# Patient Record
Sex: MALE | Race: WHITE | NOT HISPANIC OR LATINO | Employment: FULL TIME | URBAN - NONMETROPOLITAN AREA
[De-identification: names, ages, dates, MRNs, and addresses within clinical notes are randomized per-mention and may not be internally consistent; named-entity substitution may affect disease eponyms.]

---

## 2018-01-12 ENCOUNTER — APPOINTMENT (OUTPATIENT)
Dept: CT IMAGING | Facility: HOSPITAL | Age: 43
End: 2018-01-12

## 2018-01-12 ENCOUNTER — APPOINTMENT (OUTPATIENT)
Dept: GENERAL RADIOLOGY | Facility: HOSPITAL | Age: 43
End: 2018-01-12

## 2018-01-12 ENCOUNTER — HOSPITAL ENCOUNTER (EMERGENCY)
Facility: HOSPITAL | Age: 43
Discharge: HOME | End: 2018-01-12
Attending: EMERGENCY MEDICINE | Admitting: EMERGENCY MEDICINE

## 2018-01-12 VITALS
TEMPERATURE: 98.2 F | RESPIRATION RATE: 18 BRPM | SYSTOLIC BLOOD PRESSURE: 126 MMHG | HEART RATE: 87 BPM | BODY MASS INDEX: 27.77 KG/M2 | DIASTOLIC BLOOD PRESSURE: 88 MMHG | OXYGEN SATURATION: 99 % | WEIGHT: 240 LBS | HEIGHT: 78 IN

## 2018-01-12 DIAGNOSIS — M25.512 ACUTE PAIN OF LEFT SHOULDER: ICD-10-CM
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
## 2018-01-12 PROCEDURE — 70450 CT HEAD/BRAIN W/O DYE: CPT

## 2018-01-12 PROCEDURE — 72125 CT NECK SPINE W/O DYE: CPT

## 2018-01-12 PROCEDURE — 73030 X-RAY EXAM OF SHOULDER: CPT

## 2018-01-12 PROCEDURE — 99283 EMERGENCY DEPT VISIT LOW MDM: CPT

## 2018-01-12 RX ORDER — SODIUM CHLORIDE 0.9 % (FLUSH) 0.9 %
10 SYRINGE (ML) INJECTION AS NEEDED
Status: DISCONTINUED | OUTPATIENT
Start: 2018-01-12 | End: 2018-01-12

## 2018-01-12 RX ORDER — PANTOPRAZOLE SODIUM 20 MG/1
20 TABLET, DELAYED RELEASE ORAL DAILY
COMMUNITY
End: 2018-01-23 | Stop reason: SDUPTHER

## 2018-01-12 RX ORDER — LIDOCAINE HYDROCHLORIDE 10 MG/ML
10 INJECTION, SOLUTION INFILTRATION; PERINEURAL ONCE
Status: COMPLETED | OUTPATIENT
Start: 2018-01-12 | End: 2018-01-12

## 2018-01-12 RX ORDER — IBUPROFEN 800 MG/1
800 TABLET ORAL EVERY 8 HOURS PRN
Qty: 60 TABLET | Refills: 0 | Status: SHIPPED | OUTPATIENT
Start: 2018-01-12

## 2018-01-12 RX ORDER — CYCLOBENZAPRINE HCL 10 MG
10 TABLET ORAL 3 TIMES DAILY PRN
Qty: 10 TABLET | Refills: 0 | Status: SHIPPED | OUTPATIENT
Start: 2018-01-12

## 2018-01-12 RX ORDER — ASPIRIN 81 MG/1
81 TABLET ORAL DAILY
COMMUNITY

## 2018-01-12 RX ORDER — BACITRACIN ZINC 500 [USP'U]/G
OINTMENT TOPICAL ONCE
Status: COMPLETED | OUTPATIENT
Start: 2018-01-12 | End: 2018-01-12

## 2018-01-12 RX ORDER — IBUPROFEN 800 MG/1
800 TABLET ORAL ONCE
Status: COMPLETED | OUTPATIENT
Start: 2018-01-12 | End: 2018-01-12

## 2018-01-12 RX ADMIN — BACITRACIN: 500 OINTMENT TOPICAL at 07:18

## 2018-01-12 RX ADMIN — IBUPROFEN 800 MG: 800 TABLET, FILM COATED ORAL at 07:25

## 2018-01-12 RX ADMIN — LIDOCAINE HYDROCHLORIDE 10 ML: 10 INJECTION, SOLUTION INFILTRATION; PERINEURAL at 07:03
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
## 2018-01-23 ENCOUNTER — OFFICE VISIT (OUTPATIENT)
Dept: PULMONOLOGY | Facility: CLINIC | Age: 43
End: 2018-01-23

## 2018-01-23 VITALS
DIASTOLIC BLOOD PRESSURE: 76 MMHG | RESPIRATION RATE: 18 BRPM | HEIGHT: 78 IN | OXYGEN SATURATION: 96 % | WEIGHT: 235 LBS | HEART RATE: 110 BPM | BODY MASS INDEX: 27.19 KG/M2 | SYSTOLIC BLOOD PRESSURE: 120 MMHG

## 2018-01-23 DIAGNOSIS — G47.19 EXCESSIVE DAYTIME SLEEPINESS: ICD-10-CM

## 2018-01-23 DIAGNOSIS — J44.9 CHRONIC OBSTRUCTIVE PULMONARY DISEASE, UNSPECIFIED COPD TYPE (HCC): ICD-10-CM

## 2018-01-23 DIAGNOSIS — G47.33 OBSTRUCTIVE SLEEP APNEA: ICD-10-CM

## 2018-01-23 DIAGNOSIS — R06.83 SNORING: ICD-10-CM

## 2018-01-23 DIAGNOSIS — F17.200 SMOKING: ICD-10-CM

## 2018-01-23 DIAGNOSIS — R06.02 SHORTNESS OF BREATH: Primary | ICD-10-CM

## 2018-01-23 DIAGNOSIS — J42 CHRONIC BRONCHITIS, UNSPECIFIED CHRONIC BRONCHITIS TYPE (HCC): ICD-10-CM

## 2018-01-23 PROCEDURE — 99245 OFF/OP CONSLTJ NEW/EST HI 55: CPT | Performed by: INTERNAL MEDICINE

## 2018-01-23 PROCEDURE — 95012 NITRIC OXIDE EXP GAS DETER: CPT | Performed by: INTERNAL MEDICINE

## 2018-01-23 RX ORDER — BUPROPION HYDROCHLORIDE 150 MG/1
150 TABLET, EXTENDED RELEASE ORAL EVERY 12 HOURS SCHEDULED
Qty: 60 TABLET | Refills: 3 | Status: SHIPPED | OUTPATIENT
Start: 2018-01-23

## 2018-01-23 RX ORDER — PANTOPRAZOLE SODIUM 40 MG/1
TABLET, DELAYED RELEASE ORAL
Refills: 0 | COMMUNITY
Start: 2018-01-11

## 2018-01-23 RX ORDER — ALBUTEROL SULFATE 90 UG/1
2 AEROSOL, METERED RESPIRATORY (INHALATION) EVERY 4 HOURS PRN
Qty: 1 INHALER | Refills: 5 | Status: SHIPPED | OUTPATIENT
Start: 2018-01-23

## 2018-01-23 RX ORDER — FLUTICASONE PROPIONATE 50 MCG
2 SPRAY, SUSPENSION (ML) NASAL DAILY
COMMUNITY

## 2018-01-23 RX ORDER — LISINOPRIL 2.5 MG/1
TABLET ORAL
Refills: 0 | COMMUNITY
Start: 2018-01-11

## 2018-01-23 RX ORDER — METOPROLOL SUCCINATE 25 MG/1
TABLET, EXTENDED RELEASE ORAL
Refills: 0 | COMMUNITY
Start: 2017-11-27

## 2018-01-23 NOTE — PROGRESS NOTES
"CONSULT NOTE    Requested by:     BOLA Infante      Chief Complaint   Patient presents with   • Consult   • Sleeping Problem   • Shortness of Breath       Subjective   Justin Llanos is a 42 y.o. male.     History of Present Illness   Patient was sent in today for evaluation of shortness of breath. Patient says that for the past few years, he has had shortness of breath. Patient has had decreased exercise capacity that has been progressive for the past few years. Patient also says that he brings up phlegm in the morning along with cough.     He reports occasional worsening in symptoms with the change in the weather.    Patient also notes having progressive worsening in his ability to walk up the hill or walk up a flight of stairs.     Patient reports positive history of smoking for many years.  He is currently smoking 1.5 pack a day and has been doing so for 24 years.    Patient does have a family history of lung diseases, including COPD in his father and sister.    He was recently started on ReoPro which has definitely helped his symptoms although he continues to have difficulty with exertion especially on an incline.    Upon questioning he is also complaining of sleep disturbance. Patient says that for the past few years, he has had trouble with snoring. Patient has also been told that he sometimes quits breathing at night.       Patient says that he feels somewhat tired in the morning after waking up. He is also complaining of falling asleep while watching TV and sometimes while reading a book.    Patient's sleep schedule was reviewed. He drinks 5-6 cups/cans of caffeinated drinks per day.  He also drinks 1 \"energy drink\" per day.     The following portions of the patient's history were reviewed and updated as appropriate: allergies, current medications, past family history, past medical history, past social history and past surgical history.    Review of Systems   Constitutional: Positive for fatigue. " "Negative for chills and fever.   HENT: Positive for postnasal drip, rhinorrhea and sinus pressure. Negative for sneezing and sore throat.    Respiratory: Positive for cough, chest tightness, shortness of breath and wheezing.    Cardiovascular: Positive for chest pain. Negative for palpitations and leg swelling.   Psychiatric/Behavioral: Positive for sleep disturbance.   All other systems reviewed and are negative.      Past Medical History:   Diagnosis Date   • COPD (chronic obstructive pulmonary disease)    • GERD (gastroesophageal reflux disease)    • Heart abnormality     \"low pumping heart\"       Social History   Substance Use Topics   • Smoking status: Current Every Day Smoker     Packs/day: 1.50     Years: 24.00     Types: Cigarettes   • Smokeless tobacco: Never Used   • Alcohol use No         Objective   Visit Vitals   • /76   • Pulse 110   • Resp 18   • Ht 198.1 cm (78\")   • Wt 107 kg (235 lb)   • SpO2 96%   • BMI 27.16 kg/m2       Physical Exam   Constitutional: He is oriented to person, place, and time. He appears well-developed and well-nourished.   HENT:   Head: Normocephalic and atraumatic.   Eyes: EOM are normal.   Neck: Neck supple.   Cardiovascular: Normal rate and regular rhythm.    Pulmonary/Chest: Effort normal. No respiratory distress.   Somewhat hyperresonant to percussion  Somewhat decreased A/E with mild scattered wheezing noted.   Musculoskeletal: He exhibits no edema.   Neurological: He is alert and oriented to person, place, and time.   Skin: Skin is warm and dry.   Psychiatric: He has a normal mood and affect.   Vitals reviewed.      Assessment/Plan   Justin was seen today for consult, sleeping problem and shortness of breath.    Diagnoses and all orders for this visit:    Shortness of breath  -     Alpha - 1 - Antitrypsin Phenotype; Future  -     Pulmonary Function Test; Future  -     Nitric Oxide    Chronic obstructive pulmonary disease, unspecified COPD type  -     Alpha - 1 - " Antitrypsin Phenotype; Future  -     Pulmonary Function Test; Future  -     Polysomnography 4 or More Parameters; Future    Smoking    Chronic bronchitis, unspecified chronic bronchitis type    Snoring  -     Polysomnography 4 or More Parameters; Future    Excessive daytime sleepiness  -     Polysomnography 4 or More Parameters; Future    Obstructive sleep apnea    Other orders  -     buPROPion SR (WELLBUTRIN SR) 150 MG 12 hr tablet; Take 1 tablet by mouth Every 12 (Twelve) Hours.  -     Tiotropium Bromide Monohydrate (SPIRIVA RESPIMAT) 2.5 MCG/ACT aerosol solution; Inhale 2 puffs Daily.  -     albuterol (VENTOLIN HFA) 108 (90 Base) MCG/ACT inhaler; Inhale 2 puffs Every 4 (Four) Hours As Needed for Wheezing or Shortness of Air.         Return in about 10 weeks (around 4/3/2018) for Recheck, PFTs, Labs, For Stacy, Give pt sleep questionairre, Sleep study.    DISCUSSION(if any):  I have reviewed the patient's imaging studies and shared them with him.  His chest x-ray from a few months ago showed left basilar atelectasis but otherwise unremarkable.    I have also reviewed his primary care provider's office note that mentions continued smoking and failure to respond to Chantix.  It also mentioned blisters from using nicotine patch     I reviewed his PFT data personally and although there was no obstructive defect mother appeared to be minimal restriction (75%) with preserved diffusion capacity.    FeNO was <5.    ===========================  ===========================    Orders as above    Patient was told that the shortness of breath is most likely from obstructive lung disease.     Patient was educated on compliance with, and the correct method of using the pulmonary medicines.     Side effects, of prescribed medicines, discussed.    I have told the patient that we will made further recommendations, based on clinical response.     Patient was counseled on the need to quit smoking as soon as possible.    Patient was  given reading material.     I have encouraged the patient to call or schedule a visit earlier, if there are any concerns.    Sleep questionnaire was provided to the patient    The pathophysiology of sleep apnea was discussed, with the patient.     We will encourage the patient to schedule the sleep study soon.     The patient was made aware of the limitation of the home sleep study, whereby it may underestimate the true AHI and also carries a low sensitivity.  The patient was also told that even if the home sleep study is negative, we may suggest an in lab sleep study to completely and definitively rule out/in sleep apnea.  The patient has understood.  This was communicated to the patient, in case home study is to be requested.    The patient is agreeable to try CPAP/BiPAP, if needed.     Patient was educated on good sleep hygiene measures and voiced understanding of the same.     Patient was given reading material regarding sleep apnea    Dictated utilizing Dragon dictation.    This document was electronically signed by Andrea Thibodeaux MD January 23, 2018  4:12 PM

## 2018-01-28 ENCOUNTER — RESULTS ENCOUNTER (OUTPATIENT)
Dept: PULMONOLOGY | Facility: CLINIC | Age: 43
End: 2018-01-28

## 2018-01-28 DIAGNOSIS — R06.02 SHORTNESS OF BREATH: ICD-10-CM

## 2018-01-28 DIAGNOSIS — J44.9 CHRONIC OBSTRUCTIVE PULMONARY DISEASE, UNSPECIFIED COPD TYPE (HCC): ICD-10-CM

## 2018-02-09 ENCOUNTER — HOSPITAL ENCOUNTER (OUTPATIENT)
Dept: SLEEP MEDICINE | Facility: HOSPITAL | Age: 43
Setting detail: THERAPIES SERIES
End: 2018-02-09
Attending: INTERNAL MEDICINE

## 2018-02-09 DIAGNOSIS — R06.83 SNORING: ICD-10-CM

## 2018-02-09 DIAGNOSIS — G47.19 EXCESSIVE DAYTIME SLEEPINESS: ICD-10-CM

## 2018-02-09 DIAGNOSIS — J44.9 CHRONIC OBSTRUCTIVE PULMONARY DISEASE, UNSPECIFIED COPD TYPE (HCC): ICD-10-CM

## 2018-02-09 PROCEDURE — 95810 POLYSOM 6/> YRS 4/> PARAM: CPT

## 2018-02-09 PROCEDURE — 95810 POLYSOM 6/> YRS 4/> PARAM: CPT | Performed by: INTERNAL MEDICINE

## 2020-12-17 NOTE — DISCHARGE INSTRUCTIONS
Hypertension  Hypertension, commonly called high blood pressure, is when the force of blood pumping through your arteries is too strong. Your arteries are the blood vessels that carry blood from your heart throughout your body. A blood pressure reading consists of a higher number over a lower number, such as 110/72. The higher number (systolic) is the pressure inside your arteries when your heart pumps. The lower number (diastolic) is the pressure inside your arteries when your heart relaxes. Ideally you want your blood pressure below 120/80.  Hypertension forces your heart to work harder to pump blood. Your arteries may become narrow or stiff. Having untreated or uncontrolled hypertension can cause heart attack, stroke, kidney disease, and other problems.  What increases the risk?  Some risk factors for high blood pressure are controllable. Others are not.  Risk factors you cannot control include:  · Race. You may be at higher risk if you are .  · Age. Risk increases with age.  · Gender. Men are at higher risk than women before age 45 years. After age 65, women are at higher risk than men.  Risk factors you can control include:  · Not getting enough exercise or physical activity.  · Being overweight.  · Getting too much fat, sugar, calories, or salt in your diet.  · Drinking too much alcohol.  What are the signs or symptoms?  Hypertension does not usually cause signs or symptoms. Extremely high blood pressure (hypertensive crisis) may cause headache, anxiety, shortness of breath, and nosebleed.  How is this diagnosed?  To check if you have hypertension, your health care provider will measure your blood pressure while you are seated, with your arm held at the level of your heart. It should be measured at least twice using the same arm. Certain conditions can cause a difference in blood pressure between your right and left arms. A blood pressure reading that is higher than normal on one occasion  does not mean that you need treatment. If it is not clear whether you have high blood pressure, you may be asked to return on a different day to have your blood pressure checked again. Or, you may be asked to monitor your blood pressure at home for 1 or more weeks.  How is this treated?  Treating high blood pressure includes making lifestyle changes and possibly taking medicine. Living a healthy lifestyle can help lower high blood pressure. You may need to change some of your habits.  Lifestyle changes may include:  · Following the DASH diet. This diet is high in fruits, vegetables, and whole grains. It is low in salt, red meat, and added sugars.  · Keep your sodium intake below 2,300 mg per day.  · Getting at least 30-45 minutes of aerobic exercise at least 4 times per week.  · Losing weight if necessary.  · Not smoking.  · Limiting alcoholic beverages.  · Learning ways to reduce stress.  Your health care provider may prescribe medicine if lifestyle changes are not enough to get your blood pressure under control, and if one of the following is true:  · You are 18-59 years of age and your systolic blood pressure is above 140.  · You are 60 years of age or older, and your systolic blood pressure is above 150.  · Your diastolic blood pressure is above 90.  · You have diabetes, and your systolic blood pressure is over 140 or your diastolic blood pressure is over 90.  · You have kidney disease and your blood pressure is above 140/90.  · You have heart disease and your blood pressure is above 140/90.  Your personal target blood pressure may vary depending on your medical conditions, your age, and other factors.  Follow these instructions at home:  · Have your blood pressure rechecked as directed by your health care provider.  · Take medicines only as directed by your health care provider. Follow the directions carefully. Blood pressure medicines must be taken as prescribed. The medicine does not work as well when you  skip doses. Skipping doses also puts you at risk for problems.  · Do not smoke.  · Monitor your blood pressure at home as directed by your health care provider.  Contact a health care provider if:  · You think you are having a reaction to medicines taken.  · You have recurrent headaches or feel dizzy.  · You have swelling in your ankles.  · You have trouble with your vision.  Get help right away if:  · You develop a severe headache or confusion.  · You have unusual weakness, numbness, or feel faint.  · You have severe chest or abdominal pain.  · You vomit repeatedly.  · You have trouble breathing.  This information is not intended to replace advice given to you by your health care provider. Make sure you discuss any questions you have with your health care provider.  Document Released: 12/18/2006 Document Revised: 05/25/2017 Document Reviewed: 10/10/2014  [employer]vier Interactive Patient Education © 2017 [employer]     Acute on chronic kidney failure

## 2024-03-28 ENCOUNTER — APPOINTMENT (OUTPATIENT)
Dept: GENERAL RADIOLOGY | Facility: HOSPITAL | Age: 49
End: 2024-03-28
Payer: COMMERCIAL

## 2024-03-28 ENCOUNTER — APPOINTMENT (OUTPATIENT)
Dept: CT IMAGING | Facility: HOSPITAL | Age: 49
End: 2024-03-28
Payer: COMMERCIAL

## 2024-03-28 ENCOUNTER — HOSPITAL ENCOUNTER (OUTPATIENT)
Facility: HOSPITAL | Age: 49
Setting detail: OBSERVATION
Discharge: HOME | End: 2024-03-29
Attending: STUDENT IN AN ORGANIZED HEALTH CARE EDUCATION/TRAINING PROGRAM | Admitting: FAMILY MEDICINE
Payer: COMMERCIAL

## 2024-03-28 DIAGNOSIS — R07.9 CHEST PAIN, UNSPECIFIED TYPE: Primary | ICD-10-CM

## 2024-03-28 LAB
ALBUMIN SERPL-MCNC: 4.2 G/DL (ref 3.5–5.2)
ALBUMIN/GLOB SERPL: 1.6 G/DL
ALP SERPL-CCNC: 97 U/L (ref 39–117)
ALT SERPL W P-5'-P-CCNC: 30 U/L (ref 1–41)
ANION GAP SERPL CALCULATED.3IONS-SCNC: 10.3 MMOL/L (ref 5–15)
AST SERPL-CCNC: 19 U/L (ref 1–40)
BASOPHILS # BLD AUTO: 0.1 10*3/MM3 (ref 0–0.2)
BASOPHILS NFR BLD AUTO: 1.2 % (ref 0–1.5)
BILIRUB SERPL-MCNC: 0.3 MG/DL (ref 0–1.2)
BUN SERPL-MCNC: 17 MG/DL (ref 6–20)
BUN/CREAT SERPL: 13.1 (ref 7–25)
CALCIUM SPEC-SCNC: 9 MG/DL (ref 8.6–10.5)
CHLORIDE SERPL-SCNC: 104 MMOL/L (ref 98–107)
CK MB SERPL-CCNC: 3.42 NG/ML
CK SERPL-CCNC: 127 U/L (ref 20–200)
CO2 SERPL-SCNC: 25.7 MMOL/L (ref 22–29)
CREAT SERPL-MCNC: 1.3 MG/DL (ref 0.76–1.27)
DEPRECATED RDW RBC AUTO: 41.7 FL (ref 37–54)
EGFRCR SERPLBLD CKD-EPI 2021: 67.8 ML/MIN/1.73
EOSINOPHIL # BLD AUTO: 0.52 10*3/MM3 (ref 0–0.4)
EOSINOPHIL NFR BLD AUTO: 6.2 % (ref 0.3–6.2)
ERYTHROCYTE [DISTWIDTH] IN BLOOD BY AUTOMATED COUNT: 12.6 % (ref 12.3–15.4)
GEN 5 2HR TROPONIN T REFLEX: 7 NG/L
GLOBULIN UR ELPH-MCNC: 2.6 GM/DL
GLUCOSE SERPL-MCNC: 118 MG/DL (ref 65–99)
HCT VFR BLD AUTO: 43 % (ref 37.5–51)
HGB BLD-MCNC: 14.8 G/DL (ref 13–17.7)
HOLD SPECIMEN: NORMAL
HOLD SPECIMEN: NORMAL
IMM GRANULOCYTES # BLD AUTO: 0.01 10*3/MM3 (ref 0–0.05)
IMM GRANULOCYTES NFR BLD AUTO: 0.1 % (ref 0–0.5)
LYMPHOCYTES # BLD AUTO: 3.01 10*3/MM3 (ref 0.7–3.1)
LYMPHOCYTES NFR BLD AUTO: 35.9 % (ref 19.6–45.3)
MAGNESIUM SERPL-MCNC: 2.2 MG/DL (ref 1.6–2.6)
MCH RBC QN AUTO: 31 PG (ref 26.6–33)
MCHC RBC AUTO-ENTMCNC: 34.4 G/DL (ref 31.5–35.7)
MCV RBC AUTO: 90 FL (ref 79–97)
MONOCYTES # BLD AUTO: 0.79 10*3/MM3 (ref 0.1–0.9)
MONOCYTES NFR BLD AUTO: 9.4 % (ref 5–12)
NEUTROPHILS NFR BLD AUTO: 3.96 10*3/MM3 (ref 1.7–7)
NEUTROPHILS NFR BLD AUTO: 47.2 % (ref 42.7–76)
NRBC BLD AUTO-RTO: 0 /100 WBC (ref 0–0.2)
NT-PROBNP SERPL-MCNC: 65.7 PG/ML (ref 0–450)
PLATELET # BLD AUTO: 300 10*3/MM3 (ref 140–450)
PMV BLD AUTO: 8.6 FL (ref 6–12)
POTASSIUM SERPL-SCNC: 3.8 MMOL/L (ref 3.5–5.2)
PROT SERPL-MCNC: 6.8 G/DL (ref 6–8.5)
RBC # BLD AUTO: 4.78 10*6/MM3 (ref 4.14–5.8)
SODIUM SERPL-SCNC: 140 MMOL/L (ref 136–145)
TROPONIN T DELTA: -1 NG/L
TROPONIN T SERPL HS-MCNC: 8 NG/L
WBC NRBC COR # BLD AUTO: 8.39 10*3/MM3 (ref 3.4–10.8)
WHOLE BLOOD HOLD COAG: NORMAL
WHOLE BLOOD HOLD SPECIMEN: NORMAL

## 2024-03-28 PROCEDURE — 83880 ASSAY OF NATRIURETIC PEPTIDE: CPT

## 2024-03-28 PROCEDURE — 25810000003 SODIUM CHLORIDE 0.9 % SOLUTION

## 2024-03-28 PROCEDURE — G0378 HOSPITAL OBSERVATION PER HR: HCPCS

## 2024-03-28 PROCEDURE — 96360 HYDRATION IV INFUSION INIT: CPT

## 2024-03-28 PROCEDURE — 85025 COMPLETE CBC W/AUTO DIFF WBC: CPT | Performed by: STUDENT IN AN ORGANIZED HEALTH CARE EDUCATION/TRAINING PROGRAM

## 2024-03-28 PROCEDURE — 36415 COLL VENOUS BLD VENIPUNCTURE: CPT

## 2024-03-28 PROCEDURE — 93005 ELECTROCARDIOGRAM TRACING: CPT | Performed by: STUDENT IN AN ORGANIZED HEALTH CARE EDUCATION/TRAINING PROGRAM

## 2024-03-28 PROCEDURE — 80053 COMPREHEN METABOLIC PANEL: CPT | Performed by: STUDENT IN AN ORGANIZED HEALTH CARE EDUCATION/TRAINING PROGRAM

## 2024-03-28 PROCEDURE — 71045 X-RAY EXAM CHEST 1 VIEW: CPT

## 2024-03-28 PROCEDURE — 99284 EMERGENCY DEPT VISIT MOD MDM: CPT

## 2024-03-28 PROCEDURE — 82553 CREATINE MB FRACTION: CPT

## 2024-03-28 PROCEDURE — 99222 1ST HOSP IP/OBS MODERATE 55: CPT | Performed by: FAMILY MEDICINE

## 2024-03-28 PROCEDURE — 83735 ASSAY OF MAGNESIUM: CPT

## 2024-03-28 PROCEDURE — 82550 ASSAY OF CK (CPK): CPT

## 2024-03-28 PROCEDURE — 84484 ASSAY OF TROPONIN QUANT: CPT | Performed by: STUDENT IN AN ORGANIZED HEALTH CARE EDUCATION/TRAINING PROGRAM

## 2024-03-28 PROCEDURE — 70450 CT HEAD/BRAIN W/O DYE: CPT

## 2024-03-28 RX ORDER — NITROGLYCERIN 0.4 MG/1
0.4 TABLET SUBLINGUAL ONCE
Status: COMPLETED | OUTPATIENT
Start: 2024-03-28 | End: 2024-03-28

## 2024-03-28 RX ORDER — ASPIRIN 325 MG
325 TABLET ORAL ONCE
Status: COMPLETED | OUTPATIENT
Start: 2024-03-28 | End: 2024-03-28

## 2024-03-28 RX ORDER — SODIUM CHLORIDE 0.9 % (FLUSH) 0.9 %
10 SYRINGE (ML) INJECTION AS NEEDED
Status: DISCONTINUED | OUTPATIENT
Start: 2024-03-28 | End: 2024-03-29

## 2024-03-28 RX ADMIN — SODIUM CHLORIDE 1000 ML: 9 INJECTION, SOLUTION INTRAVENOUS at 19:40

## 2024-03-28 RX ADMIN — ASPIRIN 325 MG: 325 TABLET ORAL at 19:09

## 2024-03-28 RX ADMIN — NITROGLYCERIN 0.4 MG: 0.4 TABLET SUBLINGUAL at 19:09

## 2024-03-28 NOTE — Clinical Note
Level of Care: Telemetry [5]   Diagnosis: Chest pain, unspecified [786.50.ICD-9-CM]   Admitting Physician: ASHLEY GONZALEZ [[id number]]   Attending Physician: ASHLEY GONZALEZ [[id number]]

## 2024-03-28 NOTE — ED PROVIDER NOTES
"Subjective  History of Present Illness:    This is a 48-year-old male, history of COPD, GERD, present ED today for evaluation of chest pain, hypertension.  Patient reports that he is on 2 outpatient medications for his blood pressure, unsure what they are.  Reports a chest heaviness that started around 30 minutes prior to arrival.  Mild associated shortness of air.  No cough no fevers.  Former smoker but is not smoked in around a year.  He does report a sensation of his head feeling off and lightheadedness with a headache throughout the day prior to his chest pain starting.  No pleuritic chest pain, he is nontachycardic not hypoxic on arrival and nontachypneic.  No hemoptysis.  No unilateral leg pain or swelling and no prior DVT or PE, no nausea vomiting diaphoresis.  Pain is nonradiating.  No trauma no injuries.      Nurses Notes reviewed and agree, including vitals, allergies, social history and prior medical history.     REVIEW OF SYSTEMS: All systems reviewed and not pertinent unless noted.  Review of Systems   Constitutional:  Negative for fever.   Respiratory:  Positive for shortness of breath. Negative for cough.    Cardiovascular:  Positive for chest pain. Negative for palpitations and leg swelling.   Gastrointestinal:  Negative for abdominal pain, nausea and vomiting.   Neurological:  Positive for headaches.   All other systems reviewed and are negative.      Past Medical History:   Diagnosis Date    COPD (chronic obstructive pulmonary disease)     GERD (gastroesophageal reflux disease)     Heart abnormality     \"low pumping heart\"       Allergies:    Sulfa antibiotics      Past Surgical History:   Procedure Laterality Date    CHOLECYSTECTOMY      HERNIA REPAIR           Social History     Socioeconomic History    Marital status:    Tobacco Use    Smoking status: Every Day     Current packs/day: 1.50     Average packs/day: 1.5 packs/day for 24.0 years (36.0 ttl pk-yrs)     Types: Cigarettes    " "Smokeless tobacco: Never   Vaping Use    Vaping status: Never Used   Substance and Sexual Activity    Alcohol use: No    Drug use: No    Sexual activity: Defer         Family History   Problem Relation Age of Onset    Asthma Mother     Arthritis Father     Lung disease Father     COPD Father        Objective  Physical Exam:  /80   Pulse 74   Temp 97.2 °F (36.2 °C) (Oral)   Resp 20   Ht 198.1 cm (78\")   Wt 117 kg (259 lb)   SpO2 98%   BMI 29.93 kg/m²      Physical Exam  Vitals and nursing note reviewed.   Constitutional:       General: He is not in acute distress.     Appearance: He is well-developed. He is not ill-appearing, toxic-appearing or diaphoretic.   Eyes:      Extraocular Movements: Extraocular movements intact.      Pupils: Pupils are equal, round, and reactive to light.   Cardiovascular:      Rate and Rhythm: Normal rate and regular rhythm.      Pulses:           Radial pulses are 2+ on the right side and 2+ on the left side.      Heart sounds: Normal heart sounds.   Pulmonary:      Effort: Pulmonary effort is normal.      Breath sounds: Normal breath sounds. No decreased breath sounds, wheezing, rhonchi or rales.   Chest:      Chest wall: Tenderness present. No mass.   Musculoskeletal:      Cervical back: Normal range of motion.      Right lower leg: No tenderness. No edema.      Left lower leg: No tenderness. No edema.   Skin:     General: Skin is warm and dry.      Capillary Refill: Capillary refill takes less than 2 seconds.   Neurological:      General: No focal deficit present.      Mental Status: He is alert and oriented to person, place, and time.   Psychiatric:         Mood and Affect: Mood normal.         Behavior: Behavior normal.               Procedures    ED Course:    ED Course as of 03/28/24 2233   Thu Mar 28, 2024   1916 ATTENDING ATTESTATION  HPI: 48-year-old male presents with chest pain and headache x 1 hour.    MDM: EKG from interpretation sinus rhythm, rate 90, normal " axis, left bundle branch block, QRS duration 140 ms, no STEMI.     [JS]      ED Course User Index  [JS] Juan M Rangel DO       Lab Results (last 24 hours)       Procedure Component Value Units Date/Time    CBC & Differential [[id number]]  (Abnormal) Collected: 03/28/24 1844    Specimen: Blood Updated: 03/28/24 1858    Narrative:      The following orders were created for panel order CBC & Differential.  Procedure                               Abnormality         Status                     ---------                               -----------         ------                     CBC Auto Differential[[id number]]        Abnormal            Final result                 Please view results for these tests on the individual orders.    Comprehensive Metabolic Panel [[id number]]  (Abnormal) Collected: 03/28/24 1844    Specimen: Blood Updated: 03/28/24 1907     Glucose 118 mg/dL      BUN 17 mg/dL      Creatinine 1.30 mg/dL      Sodium 140 mmol/L      Potassium 3.8 mmol/L      Chloride 104 mmol/L      CO2 25.7 mmol/L      Calcium 9.0 mg/dL      Total Protein 6.8 g/dL      Albumin 4.2 g/dL      ALT (SGPT) 30 U/L      AST (SGOT) 19 U/L      Alkaline Phosphatase 97 U/L      Total Bilirubin 0.3 mg/dL      Globulin 2.6 gm/dL      A/G Ratio 1.6 g/dL      BUN/Creatinine Ratio 13.1     Anion Gap 10.3 mmol/L      eGFR 67.8 mL/min/1.73     Narrative:      GFR Normal >60  Chronic Kidney Disease <60  Kidney Failure <15      High Sensitivity Troponin T [[id number]]  (Normal) Collected: 03/28/24 1844    Specimen: Blood Updated: 03/28/24 1909     HS Troponin T 8 ng/L     Narrative:      High Sensitive Troponin T Reference Range:  <14.0 ng/L- Negative Female for AMI  <22.0 ng/L- Negative Male for AMI  >=14 - Abnormal Female indicating possible myocardial injury.  >=22 - Abnormal Male indicating possible myocardial injury.   Clinicians would have to utilize clinical acumen, EKG, Troponin, and serial changes to determine if it is an Acute  Myocardial Infarction or myocardial injury due to an underlying chronic condition.         CBC Auto Differential [[id number]]  (Abnormal) Collected: 03/28/24 1844    Specimen: Blood Updated: 03/28/24 1858     WBC 8.39 10*3/mm3      RBC 4.78 10*6/mm3      Hemoglobin 14.8 g/dL      Hematocrit 43.0 %      MCV 90.0 fL      MCH 31.0 pg      MCHC 34.4 g/dL      RDW 12.6 %      RDW-SD 41.7 fl      MPV 8.6 fL      Platelets 300 10*3/mm3      Neutrophil % 47.2 %      Lymphocyte % 35.9 %      Monocyte % 9.4 %      Eosinophil % 6.2 %      Basophil % 1.2 %      Immature Grans % 0.1 %      Neutrophils, Absolute 3.96 10*3/mm3      Lymphocytes, Absolute 3.01 10*3/mm3      Monocytes, Absolute 0.79 10*3/mm3      Eosinophils, Absolute 0.52 10*3/mm3      Basophils, Absolute 0.10 10*3/mm3      Immature Grans, Absolute 0.01 10*3/mm3      nRBC 0.0 /100 WBC     BNP [[id number]]  (Normal) Collected: 03/28/24 1844    Specimen: Blood Updated: 03/28/24 1920     proBNP 65.7 pg/mL     Narrative:      This assay is used as an aid in the diagnosis of individuals suspected of having heart failure. It can be used as an aid in the diagnosis of acute decompensated heart failure (ADHF) in patients presenting with signs and symptoms of ADHF to the emergency department (ED). In addition, NT-proBNP of <300 pg/mL indicates ADHF is not likely.    Age Range Result Interpretation  NT-proBNP Concentration (pg/mL:      <50             Positive            >450                   Gray                 300-450                    Negative             <300    50-75           Positive            >900                  Gray                300-900                  Negative            <300      >75             Positive            >1800                  Gray                300-1800                  Negative            <300    CK Total & CKMB [[id number]]  (Normal) Collected: 03/28/24 1844    Specimen: Blood Updated: 03/28/24 1932     CKMB 3.42 ng/mL      Creatine Kinase 127  U/L     Narrative:      CKMB results may be falsely decreased if patient taking Biotin.    Magnesium [[id number]]  (Normal) Collected: 03/28/24 1844    Specimen: Blood Updated: 03/28/24 1932     Magnesium 2.2 mg/dL     High Sensitivity Troponin T 2Hr [[id number]]  (Normal) Collected: 03/28/24 2127    Specimen: Blood Updated: 03/28/24 2151     HS Troponin T 7 ng/L      Troponin T Delta -1 ng/L     Narrative:      High Sensitive Troponin T Reference Range:  <14.0 ng/L- Negative Female for AMI  <22.0 ng/L- Negative Male for AMI  >=14 - Abnormal Female indicating possible myocardial injury.  >=22 - Abnormal Male indicating possible myocardial injury.   Clinicians would have to utilize clinical acumen, EKG, Troponin, and serial changes to determine if it is an Acute Myocardial Infarction or myocardial injury due to an underlying chronic condition.                  CT Head Without Contrast    Result Date: 3/28/2024  FINAL REPORT TECHNIQUE: null CLINICAL HISTORY: headache hTN COMPARISON: null FINDINGS: CT head without contrast Comparison: None Findings: No intra-axial mass, midline shift, hydrocephalus, or acute hemorrhage. No significant atrophy-like change or white matter disease. There is no sinus or mastoid fluid. The orbits are within normal limits. No skull fracture.     Impression: IMPRESSION: No acute intracranial findings. Authenticated and Electronically Signed by Polo Adamson MD on 03/28/2024 09:34:02 PM        MDM     Amount and/or Complexity of Data Reviewed  Clinical lab tests: reviewed  Tests in the medicine section of CPT®: reviewed        Initial impression of presenting illness: This is a 48-year male presenting today for evaluation of chest pain and hypertension.    DDX: includes but is not limited to: Uncontrolled hypertension, unstable angina, ACS, NSTEMI, pneumothorax, pneumonia, costochondritis, others    Based on history and physical, low suspicion for dissection or PE.  Nonpleuritic chest pain.   Patient PERC negative.    Patient arrives hemodynamically stable, hypertensive at 154/108, afebrile nontachycardic nonhypoxic nontoxic-appearing with vitals interpreted by myself.     Pertinent features from physical exam: 2+ radial pulses bilaterally, sensation intact, cranial nerves II through XII are gross intact, pupils PERRLA, equal upper and lower extremity strength, speech is normal, no dysarthria, no facial asymmetry.  Cardiac auscultation regular rate and rhythm lungs clear.  No unilateral leg tenderness or swelling noted.  Abdomen soft nontender nonreactive.    Initial diagnostic plan: CBC CMP troponin, delta troponin, EKG, CK and CK-MB, BNP, magnesium, CT head without contrast    Results from initial plan were reviewed and interpreted by me revealing BMP mild renal insufficiency with a creatinine of 1.3, glucose 118, CBC unremarkable initial troponin normal.  Chest x-ray no acute process monitor rotation.  CT head negative per radiology interpretation.  Delta troponin -1.  CK CK-MB normal.  proBNP magnesium normal.  EKG reviewed and revealed a left bundle branch pattern and rate of 90.    Diagnostic information from other sources: Old record reviewed    Interventions / Re-evaluation: Aspirin 325 on arrival and 1 nitroglycerin sublingual.  Chest pain has mostly resolved after administration of these above medications.    Results/clinical rationale were discussed with patient at bedside.  Agreeable to admission for further workup    Consultations/Discussion of results with other physicians: Discussed with hospitalist for admission, heart score 5, given his high risk and presentation of chest pain without recent workup, believe he would benefit from observation and further evaluation.   accepting hospital physician    Disposition plan: Admit to hospital service.  -----    Final diagnoses:   Chest pain, unspecified type          Avi Parra PA-C  03/28/24 0407

## 2024-03-29 VITALS
WEIGHT: 259 LBS | HEIGHT: 78 IN | DIASTOLIC BLOOD PRESSURE: 89 MMHG | OXYGEN SATURATION: 99 % | RESPIRATION RATE: 20 BRPM | HEART RATE: 64 BPM | TEMPERATURE: 97.2 F | BODY MASS INDEX: 29.97 KG/M2 | SYSTOLIC BLOOD PRESSURE: 133 MMHG

## 2024-03-29 LAB
ALBUMIN SERPL-MCNC: 3.8 G/DL (ref 3.5–5.2)
ALBUMIN/GLOB SERPL: 1.7 G/DL
ALP SERPL-CCNC: 78 U/L (ref 39–117)
ALT SERPL W P-5'-P-CCNC: 26 U/L (ref 1–41)
ANION GAP SERPL CALCULATED.3IONS-SCNC: 9.5 MMOL/L (ref 5–15)
AST SERPL-CCNC: 19 U/L (ref 1–40)
BASOPHILS # BLD AUTO: 0.09 10*3/MM3 (ref 0–0.2)
BASOPHILS NFR BLD AUTO: 1.5 % (ref 0–1.5)
BILIRUB SERPL-MCNC: 0.4 MG/DL (ref 0–1.2)
BUN SERPL-MCNC: 17 MG/DL (ref 6–20)
BUN/CREAT SERPL: 16.3 (ref 7–25)
CALCIUM SPEC-SCNC: 8.4 MG/DL (ref 8.6–10.5)
CHLORIDE SERPL-SCNC: 105 MMOL/L (ref 98–107)
CHOLEST SERPL-MCNC: 151 MG/DL (ref 0–200)
CO2 SERPL-SCNC: 24.5 MMOL/L (ref 22–29)
CREAT SERPL-MCNC: 1.04 MG/DL (ref 0.76–1.27)
DEPRECATED RDW RBC AUTO: 41.7 FL (ref 37–54)
EGFRCR SERPLBLD CKD-EPI 2021: 88.6 ML/MIN/1.73
EOSINOPHIL # BLD AUTO: 0.41 10*3/MM3 (ref 0–0.4)
EOSINOPHIL NFR BLD AUTO: 6.8 % (ref 0.3–6.2)
ERYTHROCYTE [DISTWIDTH] IN BLOOD BY AUTOMATED COUNT: 12.7 % (ref 12.3–15.4)
GLOBULIN UR ELPH-MCNC: 2.3 GM/DL
GLUCOSE SERPL-MCNC: 93 MG/DL (ref 65–99)
HBA1C MFR BLD: 5.1 % (ref 4.8–5.6)
HCT VFR BLD AUTO: 40.8 % (ref 37.5–51)
HDLC SERPL-MCNC: 42 MG/DL (ref 40–60)
HGB BLD-MCNC: 13.8 G/DL (ref 13–17.7)
IMM GRANULOCYTES # BLD AUTO: 0.02 10*3/MM3 (ref 0–0.05)
IMM GRANULOCYTES NFR BLD AUTO: 0.3 % (ref 0–0.5)
LDLC SERPL CALC-MCNC: 94 MG/DL (ref 0–100)
LDLC/HDLC SERPL: 2.23 {RATIO}
LYMPHOCYTES # BLD AUTO: 2.21 10*3/MM3 (ref 0.7–3.1)
LYMPHOCYTES NFR BLD AUTO: 36.8 % (ref 19.6–45.3)
MCH RBC QN AUTO: 30.7 PG (ref 26.6–33)
MCHC RBC AUTO-ENTMCNC: 33.8 G/DL (ref 31.5–35.7)
MCV RBC AUTO: 90.9 FL (ref 79–97)
MONOCYTES # BLD AUTO: 0.69 10*3/MM3 (ref 0.1–0.9)
MONOCYTES NFR BLD AUTO: 11.5 % (ref 5–12)
NEUTROPHILS NFR BLD AUTO: 2.58 10*3/MM3 (ref 1.7–7)
NEUTROPHILS NFR BLD AUTO: 43.1 % (ref 42.7–76)
NRBC BLD AUTO-RTO: 0 /100 WBC (ref 0–0.2)
PLATELET # BLD AUTO: 240 10*3/MM3 (ref 140–450)
PMV BLD AUTO: 8.6 FL (ref 6–12)
POTASSIUM SERPL-SCNC: 4.4 MMOL/L (ref 3.5–5.2)
PROT SERPL-MCNC: 6.1 G/DL (ref 6–8.5)
RBC # BLD AUTO: 4.49 10*6/MM3 (ref 4.14–5.8)
SODIUM SERPL-SCNC: 139 MMOL/L (ref 136–145)
TRIGL SERPL-MCNC: 76 MG/DL (ref 0–150)
VLDLC SERPL-MCNC: 15 MG/DL (ref 5–40)
WBC NRBC COR # BLD AUTO: 6 10*3/MM3 (ref 3.4–10.8)

## 2024-03-29 PROCEDURE — 25010000002 ENOXAPARIN PER 10 MG: Performed by: FAMILY MEDICINE

## 2024-03-29 PROCEDURE — 83036 HEMOGLOBIN GLYCOSYLATED A1C: CPT | Performed by: FAMILY MEDICINE

## 2024-03-29 PROCEDURE — 99239 HOSP IP/OBS DSCHRG MGMT >30: CPT | Performed by: INTERNAL MEDICINE

## 2024-03-29 PROCEDURE — 99204 OFFICE O/P NEW MOD 45 MIN: CPT | Performed by: INTERNAL MEDICINE

## 2024-03-29 PROCEDURE — 80053 COMPREHEN METABOLIC PANEL: CPT | Performed by: FAMILY MEDICINE

## 2024-03-29 PROCEDURE — 96372 THER/PROPH/DIAG INJ SC/IM: CPT

## 2024-03-29 PROCEDURE — 80061 LIPID PANEL: CPT | Performed by: FAMILY MEDICINE

## 2024-03-29 PROCEDURE — G0378 HOSPITAL OBSERVATION PER HR: HCPCS

## 2024-03-29 PROCEDURE — 85025 COMPLETE CBC W/AUTO DIFF WBC: CPT | Performed by: FAMILY MEDICINE

## 2024-03-29 RX ORDER — ATORVASTATIN CALCIUM 40 MG/1
40 TABLET, FILM COATED ORAL DAILY
Status: DISCONTINUED | OUTPATIENT
Start: 2024-03-29 | End: 2024-03-29 | Stop reason: HOSPADM

## 2024-03-29 RX ORDER — NITROGLYCERIN 0.4 MG/1
0.4 TABLET SUBLINGUAL
Status: DISCONTINUED | OUTPATIENT
Start: 2024-03-29 | End: 2024-03-29 | Stop reason: HOSPADM

## 2024-03-29 RX ORDER — AMOXICILLIN 250 MG
2 CAPSULE ORAL 2 TIMES DAILY PRN
Status: DISCONTINUED | OUTPATIENT
Start: 2024-03-29 | End: 2024-03-29 | Stop reason: HOSPADM

## 2024-03-29 RX ORDER — CARVEDILOL 12.5 MG/1
12.5 TABLET ORAL 2 TIMES DAILY WITH MEALS
Qty: 60 TABLET | Refills: 0 | Status: SHIPPED | OUTPATIENT
Start: 2024-03-29 | End: 2024-04-28

## 2024-03-29 RX ORDER — ENOXAPARIN SODIUM 100 MG/ML
40 INJECTION SUBCUTANEOUS EVERY 24 HOURS
Status: DISCONTINUED | OUTPATIENT
Start: 2024-03-29 | End: 2024-03-29 | Stop reason: HOSPADM

## 2024-03-29 RX ORDER — AMLODIPINE BESYLATE 5 MG/1
5 TABLET ORAL NIGHTLY
Status: DISCONTINUED | OUTPATIENT
Start: 2024-03-29 | End: 2024-03-29

## 2024-03-29 RX ORDER — LISINOPRIL 10 MG/1
10 TABLET ORAL NIGHTLY
Status: DISCONTINUED | OUTPATIENT
Start: 2024-03-29 | End: 2024-03-29

## 2024-03-29 RX ORDER — SODIUM CHLORIDE 9 MG/ML
40 INJECTION, SOLUTION INTRAVENOUS AS NEEDED
Status: DISCONTINUED | OUTPATIENT
Start: 2024-03-29 | End: 2024-03-29 | Stop reason: HOSPADM

## 2024-03-29 RX ORDER — SODIUM CHLORIDE 0.9 % (FLUSH) 0.9 %
10 SYRINGE (ML) INJECTION AS NEEDED
Status: DISCONTINUED | OUTPATIENT
Start: 2024-03-29 | End: 2024-03-29 | Stop reason: HOSPADM

## 2024-03-29 RX ORDER — BISACODYL 5 MG/1
5 TABLET, DELAYED RELEASE ORAL DAILY PRN
Status: DISCONTINUED | OUTPATIENT
Start: 2024-03-29 | End: 2024-03-29 | Stop reason: HOSPADM

## 2024-03-29 RX ORDER — BUPROPION HYDROCHLORIDE 150 MG/1
150 TABLET, EXTENDED RELEASE ORAL EVERY 12 HOURS SCHEDULED
Status: DISCONTINUED | OUTPATIENT
Start: 2024-03-29 | End: 2024-03-29 | Stop reason: HOSPADM

## 2024-03-29 RX ORDER — ACETAMINOPHEN 160 MG/5ML
650 SOLUTION ORAL EVERY 4 HOURS PRN
Status: DISCONTINUED | OUTPATIENT
Start: 2024-03-29 | End: 2024-03-29 | Stop reason: HOSPADM

## 2024-03-29 RX ORDER — CARVEDILOL 6.25 MG/1
12.5 TABLET ORAL 2 TIMES DAILY WITH MEALS
Status: DISCONTINUED | OUTPATIENT
Start: 2024-03-29 | End: 2024-03-29 | Stop reason: HOSPADM

## 2024-03-29 RX ORDER — LISINOPRIL 20 MG/1
20 TABLET ORAL NIGHTLY
Qty: 30 TABLET | Refills: 0 | Status: SHIPPED | OUTPATIENT
Start: 2024-03-29 | End: 2024-04-28

## 2024-03-29 RX ORDER — SODIUM CHLORIDE 0.9 % (FLUSH) 0.9 %
10 SYRINGE (ML) INJECTION EVERY 12 HOURS SCHEDULED
Status: DISCONTINUED | OUTPATIENT
Start: 2024-03-29 | End: 2024-03-29 | Stop reason: HOSPADM

## 2024-03-29 RX ORDER — AMLODIPINE BESYLATE 5 MG/1
10 TABLET ORAL NIGHTLY
Status: DISCONTINUED | OUTPATIENT
Start: 2024-03-29 | End: 2024-03-29 | Stop reason: HOSPADM

## 2024-03-29 RX ORDER — ASPIRIN 81 MG/1
81 TABLET ORAL DAILY
Start: 2024-03-30

## 2024-03-29 RX ORDER — HYDRALAZINE HYDROCHLORIDE 20 MG/ML
10 INJECTION INTRAMUSCULAR; INTRAVENOUS EVERY 4 HOURS PRN
Status: DISCONTINUED | OUTPATIENT
Start: 2024-03-29 | End: 2024-03-29 | Stop reason: HOSPADM

## 2024-03-29 RX ORDER — ONDANSETRON 2 MG/ML
4 INJECTION INTRAMUSCULAR; INTRAVENOUS EVERY 6 HOURS PRN
Status: DISCONTINUED | OUTPATIENT
Start: 2024-03-29 | End: 2024-03-29 | Stop reason: HOSPADM

## 2024-03-29 RX ORDER — ACETAMINOPHEN 650 MG/1
650 SUPPOSITORY RECTAL EVERY 4 HOURS PRN
Status: DISCONTINUED | OUTPATIENT
Start: 2024-03-29 | End: 2024-03-29 | Stop reason: HOSPADM

## 2024-03-29 RX ORDER — POLYETHYLENE GLYCOL 3350 17 G/17G
17 POWDER, FOR SOLUTION ORAL DAILY PRN
Status: DISCONTINUED | OUTPATIENT
Start: 2024-03-29 | End: 2024-03-29 | Stop reason: HOSPADM

## 2024-03-29 RX ORDER — SIMVASTATIN 20 MG
20 TABLET ORAL NIGHTLY
COMMUNITY

## 2024-03-29 RX ORDER — AMLODIPINE BESYLATE 5 MG/1
5 TABLET ORAL NIGHTLY
COMMUNITY
End: 2024-03-29

## 2024-03-29 RX ORDER — AMLODIPINE BESYLATE 10 MG/1
10 TABLET ORAL NIGHTLY
Qty: 30 TABLET | Refills: 0 | Status: SHIPPED | OUTPATIENT
Start: 2024-03-29 | End: 2024-04-28

## 2024-03-29 RX ORDER — CHOLECALCIFEROL (VITAMIN D3) 125 MCG
5 CAPSULE ORAL NIGHTLY PRN
Status: DISCONTINUED | OUTPATIENT
Start: 2024-03-29 | End: 2024-03-29 | Stop reason: HOSPADM

## 2024-03-29 RX ORDER — LISINOPRIL 10 MG/1
10 TABLET ORAL NIGHTLY
COMMUNITY
End: 2024-03-29

## 2024-03-29 RX ORDER — BISACODYL 10 MG
10 SUPPOSITORY, RECTAL RECTAL DAILY PRN
Status: DISCONTINUED | OUTPATIENT
Start: 2024-03-29 | End: 2024-03-29 | Stop reason: HOSPADM

## 2024-03-29 RX ORDER — ACETAMINOPHEN 325 MG/1
650 TABLET ORAL EVERY 4 HOURS PRN
Status: DISCONTINUED | OUTPATIENT
Start: 2024-03-29 | End: 2024-03-29 | Stop reason: HOSPADM

## 2024-03-29 RX ORDER — ASPIRIN 81 MG/1
81 TABLET ORAL DAILY
Status: DISCONTINUED | OUTPATIENT
Start: 2024-03-29 | End: 2024-03-29 | Stop reason: HOSPADM

## 2024-03-29 RX ORDER — LISINOPRIL 20 MG/1
20 TABLET ORAL NIGHTLY
Status: DISCONTINUED | OUTPATIENT
Start: 2024-03-29 | End: 2024-03-29 | Stop reason: HOSPADM

## 2024-03-29 RX ADMIN — ASPIRIN 81 MG: 81 TABLET, COATED ORAL at 09:06

## 2024-03-29 RX ADMIN — BUPROPION HYDROCHLORIDE 150 MG: 150 TABLET, EXTENDED RELEASE ORAL at 09:06

## 2024-03-29 RX ADMIN — ATORVASTATIN CALCIUM 40 MG: 40 TABLET, FILM COATED ORAL at 09:06

## 2024-03-29 RX ADMIN — ENOXAPARIN SODIUM 40 MG: 100 INJECTION SUBCUTANEOUS at 09:05

## 2024-03-29 RX ADMIN — Medication 10 ML: at 09:05

## 2024-03-29 NOTE — PROGRESS NOTES
"Pharmacy Consult - Enoxaparin Dosing  Justin Llanos is a 48 y.o. male who has been consulted to dose enoxaparin for VTE prophylaxis.     Allergies  Sulfa antibiotics    Relevant clinical data and objective history reviewed:   [Ht: 198.1 cm (78\"); Wt: 117 kg (259 lb)]  Body mass index is 29.93 kg/m².  Estimated Creatinine Clearance: 100.3 mL/min (A) (by C-G formula based on SCr of 1.3 mg/dL (H)).  Results from last 7 days   Lab Units 03/29/24  0542 03/28/24  1844   HEMOGLOBIN g/dL 13.8 14.8   HEMATOCRIT % 40.8 43.0   PLATELETS 10*3/mm3 240 300   CREATININE mg/dL  --  1.30*       Asessment/Plan  Initiate Enoxaparin 40 mg SQ every 24 hours  Pharmacy will monitor Mr. Llanos's renal function and clinical status and adjust the enoxaparin dose and/or frequency as needed.    Thank you,  Nova Leon RPH,PharmD  3/29/2024  06:16 EDT      "

## 2024-03-29 NOTE — CASE MANAGEMENT/SOCIAL WORK
Case Management Discharge Note      Final Note: Plans to DC home with wife    Provided Post Acute Provider List?: N/A  N/A Provider List Comment: Patient plans to DC home; no DME needs  Provided Post Acute Provider Quality & Resource List?: N/A  N/A Quality & Resource List Comment: Patient plans to DC home; no DME needs    Selected Continued Care - Admitted Since 3/28/2024       Destination    No services have been selected for the patient.                Durable Medical Equipment    No services have been selected for the patient.                Dialysis/Infusion    No services have been selected for the patient.                Home Medical Care    No services have been selected for the patient.                Therapy    No services have been selected for the patient.                Community Resources    No services have been selected for the patient.                Community & DME    No services have been selected for the patient.                         Final Discharge Disposition Code: 01 - home or self-care

## 2024-03-29 NOTE — CONSULTS
"[facility]-Cardiology Consult Note    Referring Provider: Dorcas  Reason for Consultation: Hypertensive crisis    Patient Care Team:  Provider, No Known as PCP - General    Chief complaint : Chest tightness    Subjective:    History of present illness: This is a 48-year-old male patient with longstanding hypertension who presents to the emergency room with bifrontal headache and chest tightness.  The patient had recently returned home from taking his family member to a doctor's appointment.  He checked his blood pressure and it was greater than 200 systolic.  He reported to having a tightness in his chest which later became a dull aching discomfort.  In the emergency room his twelve-lead electrocardiogram showed sinus rhythm with a left bundle branch block.  Cardiac troponins were serially normal.  By the time he arrived in the emergency room, his blood pressure had decreased to 154/108.  He has no history of myocardial infarction or coronary revascularization.  He has been told in the past that he has \"congestive heart failure\" with a \"low pumping heart\".  He reports compliance with his medications.  He is a non-smoker.  He denies drinking alcoholic beverages.  Cardiac troponins were serially normal.  proBNP was normal.  Chest x-ray was unremarkable.    Review of Systems   Review of Systems   Constitutional: Negative for chills, diaphoresis, fever, malaise/fatigue, weight gain and weight loss.   HENT:  Negative for ear discharge, hearing loss, hoarse voice and nosebleeds.    Eyes:  Negative for discharge, double vision, pain and photophobia.   Cardiovascular:  Positive for chest pain. Negative for claudication, cyanosis, dyspnea on exertion, irregular heartbeat, leg swelling, near-syncope, orthopnea, palpitations, paroxysmal nocturnal dyspnea and syncope.   Respiratory:  Negative for cough, hemoptysis, sputum production and wheezing.    Endocrine: Negative for cold intolerance, heat intolerance, polydipsia, polyphagia and " "polyuria.   Hematologic/Lymphatic: Negative for adenopathy and bleeding problem. Does not bruise/bleed easily.   Skin:  Negative for color change, flushing, itching and rash.   Musculoskeletal:  Negative for muscle cramps, muscle weakness, myalgias and stiffness.   Gastrointestinal:  Negative for abdominal pain, diarrhea, hematemesis, hematochezia, nausea and vomiting.   Genitourinary:  Negative for dysuria, frequency and nocturia.   Neurological:  Negative for focal weakness, loss of balance, numbness, paresthesias and seizures.   Psychiatric/Behavioral:  Negative for altered mental status, hallucinations and suicidal ideas.    Allergic/Immunologic: Negative for HIV exposure, hives and persistent infections.       History  Past Medical History:   Diagnosis Date    COPD (chronic obstructive pulmonary disease)     GERD (gastroesophageal reflux disease)     Heart abnormality     \"low pumping heart\"   ,   Past Surgical History:   Procedure Laterality Date    CHOLECYSTECTOMY      HERNIA REPAIR     ,   Family History   Problem Relation Age of Onset    Asthma Mother     Arthritis Father     Lung disease Father     COPD Father    ,   Social History     Tobacco Use    Smoking status: Every Day     Current packs/day: 1.50     Average packs/day: 1.5 packs/day for 24.0 years (36.0 ttl pk-yrs)     Types: Cigarettes    Smokeless tobacco: Never   Vaping Use    Vaping status: Never Used   Substance Use Topics    Alcohol use: No    Drug use: No   , (Not in a hospital admission)   and Allergies:  Sulfa antibiotics    Objective:    Vital Sign Min/Max for last 24 hours  Temp  Min: 97.2 °F (36.2 °C)  Max: 97.2 °F (36.2 °C)   BP  Min: 126/78  Max: 154/108   Pulse  Min: 60  Max: 106   Resp  Min: 20  Max: 20   SpO2  Min: 94 %  Max: 99 %   No data recorded   Weight  Min: 117 kg (259 lb)  Max: 117 kg (259 lb)     Flowsheet Rows      Flowsheet Row First Filed Value   Admission Height 198.1 cm (78\") Documented at 03/28/2024 1836   Admission " Weight 117 kg (259 lb) Documented at 03/28/2024 1836                 Physical Exam:   Vitals and nursing note reviewed.   Constitutional:       Appearance: Healthy appearance. Not in distress.   Neck:      Vascular: No JVR. JVD normal.   Pulmonary:      Effort: Pulmonary effort is normal.      Breath sounds: Normal breath sounds. No wheezing. No rhonchi. No rales.   Chest:      Chest wall: Not tender to palpatation.   Cardiovascular:      PMI at left midclavicular line. Normal rate. Regular rhythm. Normal S1. Normal S2.       Murmurs: There is no murmur.      No gallop.  No click. No rub.   Pulses:     Intact distal pulses.   Edema:     Peripheral edema absent.   Abdominal:      General: Bowel sounds are normal.      Palpations: Abdomen is soft.      Tenderness: There is no abdominal tenderness.   Musculoskeletal: Normal range of motion.         General: No tenderness. Skin:     General: Skin is warm and dry.   Neurological:      General: No focal deficit present.      Mental Status: Alert and oriented to person, place and time.         Results Review:   I reviewed the patient's new clinical results.  Results from last 7 days   Lab Units 03/29/24  0542 03/28/24  1844   WBC 10*3/mm3 6.00 8.39   HEMOGLOBIN g/dL 13.8 14.8   HEMATOCRIT % 40.8 43.0   PLATELETS 10*3/mm3 240 300     Results from last 7 days   Lab Units 03/29/24  0542 03/28/24  1844   SODIUM mmol/L 139 140   POTASSIUM mmol/L 4.4 3.8   CHLORIDE mmol/L 105 104   CO2 mmol/L 24.5 25.7   BUN mg/dL 17 17   CREATININE mg/dL 1.04 1.30*   GLUCOSE mg/dL 93 118*   CALCIUM mg/dL 8.4* 9.0     Lab Results   Lab Value Date/Time    TROPONINT 7 03/28/2024 2127    TROPONINT 8 03/28/2024 1844     Results from last 7 days   Lab Units 03/29/24  0542   CHOLESTEROL mg/dL 151   TRIGLYCERIDES mg/dL 76   HDL CHOL mg/dL 42   LDL CHOL mg/dL 94         Assessment/Plan:      Chest pain, unspecified      I have recommended increasing amlodipine to 10 mg once per day.  I have recommended  increasing lisinopril to 20 mg orally once per day.  I have recommended starting Coreg 12.5 mg orally twice daily.    The patient may be discharged to home.    Arrange for outpatient follow-up in cardiology clinic next week with either myself, Joseph or Maye.  At that visit, we will arrange for a vasodilator nuclear stress test as well as an echocardiogram.    The patient has been counseled regarding the importance of dietary sodium restriction.  I have recommended a less than 2 L/day fluid restriction and less than 1500 mg/day sodium restriction.  The patient has been given examples of specific foods and beverages to avoid to achieve these goals.    The patient has been counseled regarding the importance of diet exercise and weight management.  He has been educated that as little as a 10% reduction in body weight could lead to significant blood pressure improvement.  The patient has been counseled to achieve at minimum 150 minutes/week of moderate intensity activity which includes walking at a brisk but comfortable pace.      I discussed the patient's findings and my recommendations with patient and family    Sheldon Dick MD  03/29/24  11:52 EDT

## 2024-03-29 NOTE — PLAN OF CARE
Goal Outcome Evaluation:         Patient meets criteria for discharge

## 2024-03-29 NOTE — PAYOR COMM NOTE
"To:  [facility]  From: Aishwarya Crowell RN  Phone: [phone]  Fax: [phone]  NPI: [id number]  TIN: [id number]  Member ID: [id number]   MRN: [id number]    OBSERVATION NOTIFICATION    Moreno Llanos (48 y.o. Male)       Date of Birth   1975    Social Security Number       Address   [address] [facility] [address]    Home Phone   [phone]    MRN   [id number]       Islam   Muslim    Marital Status                               Admission Date   3/28/24    Admission Type   Emergency    Admitting Provider   oCra Noguera MD    Attending Provider   Kristian Toscano DO    Department, Room/Bed   [facility] EMERGENCY DEPARTMENT,        Discharge Date       Discharge Disposition       Discharge Destination                                 Attending Provider: Kristian Toscano DO    Allergies: Sulfa Antibiotics    Isolation: None   Infection: None   Code Status: CPR    Ht: 198.1 cm (78\")   Wt: 117 kg (259 lb)    Admission Cmt: None   Principal Problem: Chest pain, unspecified [R07.9]                   Active Insurance as of 3/28/2024       Primary Coverage       Payor Plan Insurance Group Employer/Plan Group    [id number]       Payor Plan Address Payor Plan Phone Number Payor Plan Fax Number Effective Dates    [address] [address] [phone]  2024 - None Entered    [facility] [id number]         Subscriber Name Subscriber Birth Date Member ID       MORENO LLANOS 1975 [id number]                     Emergency Contacts        (Rel.) Home Phone Work Phone Mobile Phone    Mely Llanos (Spouse) [phone] -- --                 History & Physical        Cora Noguera MD at 24 2225            [facility] HOSPITALIST   HISTORY AND PHYSICAL      Name:  Moreno Llanos   Age:  48 y.o.  Sex:  male  :  1975  MRN:  [id number]   Visit Number:  [id number]  Admission Date:  3/28/2024  Date Of Service:  24  Primary Care Physician:  " "Provider, No Known    Chief Complaint:     Chest pain    History Of Presenting Illness:      Patient is 48 years old male with a past medical history of COPD and GERD who presented to the ER with a chief complaint of chest pain.  Patient reports that he took his mother to the doctor on the day of admission, they checked his blood pressure and was elevated at 170s systolic.  On the way going home he started having chest pain described as heaviness substernally with no radiation or shortness of breath.  His symptoms started around 45 minutes prior to arrival to the ER on the day of admission.  Patient reported associated headaches which she usually feels when his blood pressure gets elevated and out-of-control. he denies any nausea or vomiting.  No recent cough or fevers.  Patient is a former smoker but has quit smoking since June.  He has not seen a cardiologist recently but was told that he had \" electrical Heart issue\" in the past.     On ER evaluation, Patient was hypertensive on arrival with a blood pressure of 154/108, afebrile on room air.  His workup with HS Troponin of 8-7 with delta change of -1, creatinine 1.3 otherwise within acceptable range on his CBC and CMP.  Chest x-ray with no acute process per my read.  CT head without contrast with no acute intracranial findings.  Patient received aspirin 325 mg, nitroglycerin 0.5 sublingual and 1 L bolus of normal saline.  ER consulted hospitalist for admission given his high risk and presentation of chest pain without recent workup.    Review Of Systems:    All systems were reviewed and negative except as mentioned in history of presenting illness, assessment and plan.    Past Medical History: Patient  has a past medical history of COPD (chronic obstructive pulmonary disease), GERD (gastroesophageal reflux disease), and Heart abnormality.    Past Surgical History: Patient  has a past surgical history that includes Hernia repair and Cholecystectomy.    Social " History: Patient  reports that he has been smoking cigarettes. He has a 36 pack-year smoking history. He has never used smokeless tobacco. He reports that he does not drink alcohol and does not use drugs.    Family History:  Patient's family history has been reviewed and found to be noncontributory.     Allergies:      Sulfa antibiotics    Home Medications:    Prior to Admission Medications       Prescriptions Last Dose Informant Patient Reported? Taking?    albuterol (VENTOLIN HFA) 108 (90 Base) MCG/ACT inhaler   No No    Inhale 2 puffs Every 4 (Four) Hours As Needed for Wheezing or Shortness of Air.    aspirin 81 MG EC tablet   Yes No    Take 81 mg by mouth Daily.    BREO ELLIPTA 100-25 MCG/INH aerosol powder    Yes No    take 1 inhalation by mouth once daily    buPROPion SR (WELLBUTRIN SR) 150 MG 12 hr tablet   No No    Take 1 tablet by mouth Every 12 (Twelve) Hours.    cyclobenzaprine (FLEXERIL) 10 MG tablet   No No    Take 1 tablet by mouth 3 (Three) Times a Day As Needed for Muscle Spasms.    fluticasone (FLONASE) 50 MCG/ACT nasal spray   Yes No    2 sprays into each nostril Daily.    ibuprofen (ADVIL,MOTRIN) 800 MG tablet   No No    Take 1 tablet by mouth Every 8 (Eight) Hours As Needed for Mild Pain .    lisinopril (PRINIVIL,ZESTRIL) 2.5 MG tablet   Yes No    take 1 tablet by mouth once daily    metoprolol succinate XL (TOPROL-XL) 25 MG 24 hr tablet   Yes No    TAKE 1/2 TABLET BY MOUTH TWICE DAILY    pantoprazole (PROTONIX) 40 MG EC tablet   Yes No    Tiotropium Bromide Monohydrate (SPIRIVA RESPIMAT) 2.5 MCG/ACT aerosol solution   No No    Inhale 2 puffs Daily.          ED Medications:    Medications   sodium chloride 0.9 % flush 10 mL (has no administration in time range)   aspirin tablet 325 mg (325 mg Oral Given 3/28/24 1909)   nitroglycerin (NITROSTAT) SL tablet 0.4 mg (0.4 mg Sublingual Given 3/28/24 1909)   sodium chloride 0.9 % bolus 1,000 mL (1,000 mL Intravenous New Bag 3/28/24 1940)     Vital  "Signs:  Temp:  [97.2 °F (36.2 °C)] 97.2 °F (36.2 °C)  Heart Rate:  [] 74  Resp:  [20] 20  BP: (126-154)/() 137/80        03/28/24  1836   Weight: 117 kg (259 lb)     Body mass index is 29.93 kg/m².    Physical Exam:     Most recent vital Signs: /80   Pulse 74   Temp 97.2 °F (36.2 °C) (Oral)   Resp 20   Ht 198.1 cm (78\")   Wt 117 kg (259 lb)   SpO2 98%   BMI 29.93 kg/m²     Physical Exam  Vitals and nursing note reviewed.   Constitutional:       General: He is not in acute distress.     Appearance: Normal appearance.   HENT:      Head: Normocephalic and atraumatic.      Right Ear: External ear normal.      Left Ear: External ear normal.      Nose: Nose normal.      Mouth/Throat:      Mouth: Mucous membranes are moist.   Eyes:      Extraocular Movements: Extraocular movements intact.      Conjunctiva/sclera: Conjunctivae normal.      Pupils: Pupils are equal, round, and reactive to light.   Cardiovascular:      Rate and Rhythm: Normal rate and regular rhythm.      Pulses: Normal pulses.      Heart sounds: Normal heart sounds.   Pulmonary:      Effort: Pulmonary effort is normal.      Breath sounds: Normal breath sounds. No wheezing or rhonchi.   Abdominal:      General: Bowel sounds are normal. There is no distension.      Palpations: Abdomen is soft.      Tenderness: There is no abdominal tenderness.   Musculoskeletal:         General: Normal range of motion.      Cervical back: Normal range of motion and neck supple.      Right lower leg: No edema.      Left lower leg: No edema.   Skin:     General: Skin is warm and dry.      Findings: No rash.   Neurological:      General: No focal deficit present.      Mental Status: He is alert and oriented to person, place, and time. Mental status is at baseline.      Cranial Nerves: No cranial nerve deficit.      Sensory: No sensory deficit.      Motor: No weakness.   Psychiatric:         Mood and Affect: Mood normal.         Behavior: Behavior normal.  " "       Thought Content: Thought content normal.         Laboratory data:    I have reviewed the labs done in the emergency room.    Results from last 7 days   Lab Units 03/28/24  1844   SODIUM mmol/L 140   POTASSIUM mmol/L 3.8   CHLORIDE mmol/L 104   CO2 mmol/L 25.7   BUN mg/dL 17   CREATININE mg/dL 1.30*   CALCIUM mg/dL 9.0   BILIRUBIN mg/dL 0.3   ALK PHOS U/L 97   ALT (SGPT) U/L 30   AST (SGOT) U/L 19   GLUCOSE mg/dL 118*     Results from last 7 days   Lab Units 03/28/24  1844   WBC 10*3/mm3 8.39   HEMOGLOBIN g/dL 14.8   HEMATOCRIT % 43.0   PLATELETS 10*3/mm3 300         Results from last 7 days   Lab Units 03/28/24 2127 03/28/24  1844   CK TOTAL U/L  --  127   HSTROP T ng/L 7 8     Results from last 7 days   Lab Units 03/28/24  1844   PROBNP pg/mL 65.7                       Invalid input(s): \"USDES\", \"NITRITITE\", \"BACT\", \"EP\"    Pain Management Panel           No data to display                EKG:      Sinus rhythm, heart rate 90, left bundle branch block, nonspecific ST/T wave changes.    Radiology:    CT Head Without Contrast    Result Date: 3/28/2024  FINAL REPORT TECHNIQUE: null CLINICAL HISTORY: headache hTN COMPARISON: null FINDINGS: CT head without contrast Comparison: None Findings: No intra-axial mass, midline shift, hydrocephalus, or acute hemorrhage. No significant atrophy-like change or white matter disease. There is no sinus or mastoid fluid. The orbits are within normal limits. No skull fracture.     IMPRESSION: No acute intracranial findings. Authenticated and Electronically Signed by Polo Adamson MD on 03/28/2024 09:34:02 PM     Assessment:    Chest pain, POA  Hypertension, uncontrolled  History of COPD without exacerbation  GERD    Plan:    Patient is admitted for further management and treatment.    Chest pain  -Continue aspirin and statin  -Check lipid panel and A1c with a.m. labs  -Nitro as needed  -Cardiology consulted, appreciate recommendations    Hypertension  -Uncontrolled  -Patient did " not take his blood pressure medicine yesterday.    -He reports that he was of his medicines last week but picked up his medicine 3 days ago.  -Restart home meds  -Hydralazine as needed    -Continue home meds as warranted.  -Further orders as indicated per clinical course.      Risk Assessment: Moderate  DVT Prophylaxis: Lovenox  Code Status: Full  Diet: N.p.o.    Advance Care Planning  ACP discussion was held with the patient during this visit. Patient does not have an advance directive, information provided.       Cora Noguera MD  03/28/24  22:25 EDT    Dictated utilizing Dragon dictation.    Electronically signed by Cora Noguera MD at 03/29/24 0529       Vital Signs (last day)       Date/Time Temp Temp src Pulse Resp BP Patient Position SpO2    03/29/24 0546 -- -- 69 -- 143/98 -- 97    03/29/24 0435 -- -- 60 -- -- -- 97    03/29/24 0336 -- -- 63 -- -- -- 97    03/29/24 0236 -- -- 62 -- -- -- 98    03/28/24 2013 -- -- 74 -- 137/80 -- 98    03/28/24 1930 -- -- 83 -- 126/78 -- 96    03/28/24 1915 -- -- 106 -- 135/76 -- 94    03/28/24 1900 -- -- 85 -- 137/80 -- 95    03/28/24 1836 97.2 (36.2) Oral 90 20 154/108 Sitting 96          Lab Results (last 24 hours)       Procedure Component Value Units Date/Time    Comprehensive Metabolic Panel [[id number]]  (Abnormal) Collected: 03/29/24 0542    Specimen: Blood Updated: 03/29/24 0621     Glucose 93 mg/dL      BUN 17 mg/dL      Creatinine 1.04 mg/dL      Sodium 139 mmol/L      Potassium 4.4 mmol/L      Chloride 105 mmol/L      CO2 24.5 mmol/L      Calcium 8.4 mg/dL      Total Protein 6.1 g/dL      Albumin 3.8 g/dL      ALT (SGPT) 26 U/L      AST (SGOT) 19 U/L      Alkaline Phosphatase 78 U/L      Total Bilirubin 0.4 mg/dL      Globulin 2.3 gm/dL      A/G Ratio 1.7 g/dL      BUN/Creatinine Ratio 16.3     Anion Gap 9.5 mmol/L      eGFR 88.6 mL/min/1.73     Narrative:      GFR Normal >60  Chronic Kidney Disease <60  Kidney Failure <15      Lipid Panel [[id number]]  Collected: 03/29/24 0542    Specimen: Blood Updated: 03/29/24 0621     Total Cholesterol 151 mg/dL      Triglycerides 76 mg/dL      HDL Cholesterol 42 mg/dL      LDL Cholesterol  94 mg/dL      VLDL Cholesterol 15 mg/dL      LDL/HDL Ratio 2.23    Narrative:      Cholesterol Reference Ranges  (U.S. Department of Health and Human Services ATP III Classifications)    Desirable          <200 mg/dL  Borderline High    200-239 mg/dL  High Risk          >240 mg/dL      Triglyceride Reference Ranges  (U.S. Department of Health and Human Services ATP III Classifications)    Normal           <150 mg/dL  Borderline High  150-199 mg/dL  High             200-499 mg/dL  Very High        >500 mg/dL    HDL Reference Ranges  (U.S. Department of Health and Human Services ATP III Classifications)    Low     <40 mg/dl (major risk factor for CHD)  High    >60 mg/dl ('negative' risk factor for CHD)        LDL Reference Ranges  (U.S. Department of Health and Human Services ATP III Classifications)    Optimal          <100 mg/dL  Near Optimal     100-129 mg/dL  Borderline High  130-159 mg/dL  High             160-189 mg/dL  Very High        >189 mg/dL    Hemoglobin A1c [[id number]]  (Normal) Collected: 03/29/24 0542    Specimen: Blood Updated: 03/29/24 0618     Hemoglobin A1C 5.10 %     Narrative:      Hemoglobin A1C Ranges:    Increased Risk for Diabetes  5.7% to 6.4%  Diabetes                     >= 6.5%  Diabetic Goal                < 7.0%    CBC & Differential [[id number]]  (Abnormal) Collected: 03/29/24 0542    Specimen: Blood Updated: 03/29/24 0550    Narrative:      The following orders were created for panel order CBC & Differential.  Procedure                               Abnormality         Status                     ---------                               -----------         ------                     CBC Auto Differential[[id number]]        Abnormal            Final result                 Please view results for these tests on the  individual orders.    CBC Auto Differential [[id number]]  (Abnormal) Collected: 03/29/24 0542    Specimen: Blood Updated: 03/29/24 0550     WBC 6.00 10*3/mm3      RBC 4.49 10*6/mm3      Hemoglobin 13.8 g/dL      Hematocrit 40.8 %      MCV 90.9 fL      MCH 30.7 pg      MCHC 33.8 g/dL      RDW 12.7 %      RDW-SD 41.7 fl      MPV 8.6 fL      Platelets 240 10*3/mm3      Neutrophil % 43.1 %      Lymphocyte % 36.8 %      Monocyte % 11.5 %      Eosinophil % 6.8 %      Basophil % 1.5 %      Immature Grans % 0.3 %      Neutrophils, Absolute 2.58 10*3/mm3      Lymphocytes, Absolute 2.21 10*3/mm3      Monocytes, Absolute 0.69 10*3/mm3      Eosinophils, Absolute 0.41 10*3/mm3      Basophils, Absolute 0.09 10*3/mm3      Immature Grans, Absolute 0.02 10*3/mm3      nRBC 0.0 /100 WBC     High Sensitivity Troponin T 2Hr [[id number]]  (Normal) Collected: 03/28/24 2127    Specimen: Blood Updated: 03/28/24 2151     HS Troponin T 7 ng/L      Troponin T Delta -1 ng/L     Narrative:      High Sensitive Troponin T Reference Range:  <14.0 ng/L- Negative Female for AMI  <22.0 ng/L- Negative Male for AMI  >=14 - Abnormal Female indicating possible myocardial injury.  >=22 - Abnormal Male indicating possible myocardial injury.   Clinicians would have to utilize clinical acumen, EKG, Troponin, and serial changes to determine if it is an Acute Myocardial Infarction or myocardial injury due to an underlying chronic condition.         Mylo Draw [[id number]] Collected: 03/28/24 1844    Specimen: Blood Updated: 03/28/24 1947    Narrative:      The following orders were created for panel order Mylo Draw.  Procedure                               Abnormality         Status                     ---------                               -----------         ------                     Green Top (Gel)[[id number]]                                  Final result               Lavender Top[[id number]]                                     Final result                Gold Top - SST[[id number]]                                   Final result               Light Blue Top[[id number]]                                   Final result                 Please view results for these tests on the individual orders.    Green Top (Gel) [[id number]] Collected: 03/28/24 1844    Specimen: Blood Updated: 03/28/24 1947     Extra Tube Hold for add-ons.     Comment: Auto resulted.       Lavender Top [[id number]] Collected: 03/28/24 1844    Specimen: Blood Updated: 03/28/24 1947     Extra Tube hold for add-on     Comment: Auto resulted       Gold Top - SST [[id number]] Collected: 03/28/24 1844    Specimen: Blood Updated: 03/28/24 1947     Extra Tube Hold for add-ons.     Comment: Auto resulted.       Light Blue Top [[id number]] Collected: 03/28/24 1844    Specimen: Blood Updated: 03/28/24 1947     Extra Tube Hold for add-ons.     Comment: Auto resulted       CK Total & CKMB [[id number]]  (Normal) Collected: 03/28/24 1844    Specimen: Blood Updated: 03/28/24 1932     CKMB 3.42 ng/mL      Creatine Kinase 127 U/L     Narrative:      CKMB results may be falsely decreased if patient taking Biotin.    Magnesium [[id number]]  (Normal) Collected: 03/28/24 1844    Specimen: Blood Updated: 03/28/24 1932     Magnesium 2.2 mg/dL     BNP [[id number]]  (Normal) Collected: 03/28/24 1844    Specimen: Blood Updated: 03/28/24 1920     proBNP 65.7 pg/mL     Narrative:      This assay is used as an aid in the diagnosis of individuals suspected of having heart failure. It can be used as an aid in the diagnosis of acute decompensated heart failure (ADHF) in patients presenting with signs and symptoms of ADHF to the emergency department (ED). In addition, NT-proBNP of <300 pg/mL indicates ADHF is not likely.    Age Range Result Interpretation  NT-proBNP Concentration (pg/mL:      <50             Positive            >450                   Gray                 300-450                    Negative             <300    50-75            Positive            >900                  Gray                300-900                  Negative            <300      >75             Positive            >1800                  Gray                300-1800                  Negative            <300    High Sensitivity Troponin T [[id number]]  (Normal) Collected: 03/28/24 1844    Specimen: Blood Updated: 03/28/24 1909     HS Troponin T 8 ng/L     Narrative:      High Sensitive Troponin T Reference Range:  <14.0 ng/L- Negative Female for AMI  <22.0 ng/L- Negative Male for AMI  >=14 - Abnormal Female indicating possible myocardial injury.  >=22 - Abnormal Male indicating possible myocardial injury.   Clinicians would have to utilize clinical acumen, EKG, Troponin, and serial changes to determine if it is an Acute Myocardial Infarction or myocardial injury due to an underlying chronic condition.         Comprehensive Metabolic Panel [[id number]]  (Abnormal) Collected: 03/28/24 1844    Specimen: Blood Updated: 03/28/24 1907     Glucose 118 mg/dL      BUN 17 mg/dL      Creatinine 1.30 mg/dL      Sodium 140 mmol/L      Potassium 3.8 mmol/L      Chloride 104 mmol/L      CO2 25.7 mmol/L      Calcium 9.0 mg/dL      Total Protein 6.8 g/dL      Albumin 4.2 g/dL      ALT (SGPT) 30 U/L      AST (SGOT) 19 U/L      Alkaline Phosphatase 97 U/L      Total Bilirubin 0.3 mg/dL      Globulin 2.6 gm/dL      A/G Ratio 1.6 g/dL      BUN/Creatinine Ratio 13.1     Anion Gap 10.3 mmol/L      eGFR 67.8 mL/min/1.73     Narrative:      GFR Normal >60  Chronic Kidney Disease <60  Kidney Failure <15      CBC & Differential [[id number]]  (Abnormal) Collected: 03/28/24 1844    Specimen: Blood Updated: 03/28/24 1858    Narrative:      The following orders were created for panel order CBC & Differential.  Procedure                               Abnormality         Status                     ---------                               -----------         ------                     CBC Auto  Differential[[id number]]        Abnormal            Final result                 Please view results for these tests on the individual orders.    CBC Auto Differential [[id number]]  (Abnormal) Collected: 03/28/24 1844    Specimen: Blood Updated: 03/28/24 1858     WBC 8.39 10*3/mm3      RBC 4.78 10*6/mm3      Hemoglobin 14.8 g/dL      Hematocrit 43.0 %      MCV 90.0 fL      MCH 31.0 pg      MCHC 34.4 g/dL      RDW 12.6 %      RDW-SD 41.7 fl      MPV 8.6 fL      Platelets 300 10*3/mm3      Neutrophil % 47.2 %      Lymphocyte % 35.9 %      Monocyte % 9.4 %      Eosinophil % 6.2 %      Basophil % 1.2 %      Immature Grans % 0.1 %      Neutrophils, Absolute 3.96 10*3/mm3      Lymphocytes, Absolute 3.01 10*3/mm3      Monocytes, Absolute 0.79 10*3/mm3      Eosinophils, Absolute 0.52 10*3/mm3      Basophils, Absolute 0.10 10*3/mm3      Immature Grans, Absolute 0.01 10*3/mm3      nRBC 0.0 /100 WBC           Imaging Results (Last 24 Hours)       Procedure Component Value Units Date/Time    XR Chest 1 View [[id number]] Resulted: 03/28/24 2235     Updated: 03/28/24 2235    CT Head Without Contrast [[id number]] Collected: 03/28/24 2134     Updated: 03/28/24 2136    Narrative:      FINAL REPORT    TECHNIQUE:  null    CLINICAL HISTORY:  headache hTN    COMPARISON:  null    FINDINGS:  CT head without contrast    Comparison: None    Findings:    No intra-axial mass, midline shift, hydrocephalus, or acute hemorrhage.    No significant atrophy-like change or white matter disease.    There is no sinus or mastoid fluid.    The orbits are within normal limits.    No skull fracture.      Impression:      IMPRESSION:    No acute intracranial findings.    Authenticated and Electronically Signed by Polo Adamson MD on  03/28/2024 09:34:02 PM          Orders (last 24 hrs)        Start     Ordered    03/29/24 2100  amLODIPine (NORVASC) tablet 5 mg  Nightly         03/29/24 0519    03/29/24 2100  lisinopril (PRINIVIL,ZESTRIL) tablet 10 mg  Nightly          03/29/24 0519    03/29/24 0900  buPROPion SR (WELLBUTRIN SR) 12 hr tablet 150 mg  Every 12 Hours Scheduled         03/29/24 0519    03/29/24 0900  atorvastatin (LIPITOR) tablet 40 mg  Daily         03/29/24 0519    03/29/24 0900  sodium chloride 0.9 % flush 10 mL  Every 12 Hours Scheduled         03/29/24 0519    03/29/24 0900  aspirin EC tablet 81 mg  Daily         03/29/24 0519    03/29/24 0800  Vital Signs  Every 4 Hours       03/29/24 0519    03/29/24 0800  Oral Care  2 Times Daily       03/29/24 0519    03/29/24 0702  Inpatient Cardiology Consult  IN AM        Specialty:  Cardiology  Provider:  Sheldon Dick MD    03/29/24 0519    03/29/24 0600  CBC & Differential  Morning Draw         03/29/24 0417    03/29/24 0600  Comprehensive Metabolic Panel  Morning Draw         03/29/24 0417    03/29/24 0600  CBC Auto Differential  PROCEDURE ONCE         03/29/24 0417    03/29/24 0600  Incentive Spirometry  Every 4 Hours While Awake       03/29/24 0519    03/29/24 0600  Lipid Panel  Morning Draw         03/29/24 0519    03/29/24 0600  Hemoglobin A1c  Morning Draw         03/29/24 0519    03/29/24 0600  Enoxaparin Sodium (LOVENOX) syringe 40 mg  Every 24 Hours         03/29/24 0534    03/29/24 0528  hydrALAZINE (APRESOLINE) injection 10 mg  Every 4 Hours PRN         03/29/24 0528    03/29/24 0520  Intake & Output  Every Shift       03/29/24 0519    03/29/24 0520  Weigh patient  Once         03/29/24 0519    03/29/24 0520  Insert Peripheral IV  Once         03/29/24 0519    03/29/24 0520  Saline Lock & Maintain IV Access  Continuous,   Status:  Canceled         03/29/24 0519    03/29/24 0520  Place Sequential Compression Device  Once         03/29/24 0519    03/29/24 0520  Maintain Sequential Compression Device  Continuous         03/29/24 0519    03/29/24 0520  Continuous Cardiac Monitoring  Continuous        Comments: Follow Standing Orders As Outlined in Process Instructions (Open Order Report to View Full  "Instructions)    03/29/24 0519    03/29/24 0520  Maintain IV Access  Continuous         03/29/24 0519 03/29/24 0520  Telemetry - Place Orders & Notify Provider of Results When Patient Experiences Acute Chest Pain, Dysrhythmia or Respiratory Distress  Continuous        Comments: Open Order Report to View Parameters Requiring Provider Notification    03/29/24 0519 03/29/24 0520  May Be Off Telemetry for Tests  Continuous         03/29/24 0519 03/29/24 0520  Ambulate Patient  Every Shift       03/29/24 0519 03/29/24 0520  Notify Provider (With Default Parameters)  Continuous        Comments: Open Order Report to View Parameters Requiring Provider Notification    03/29/24 0519 03/29/24 0520  NPO Diet NPO Type: Strict NPO  Diet Effective Now         03/29/24 0519 03/29/24 0520  Inpatient Case Management  Consult  Once        Provider:  (Not yet assigned)    03/29/24 0519 03/29/24 0519  acetaminophen (TYLENOL) tablet 650 mg  Every 4 Hours PRN        Placed in \"Or\" Linked Group    03/29/24 0519    03/29/24 0519  acetaminophen (TYLENOL) 160 MG/5ML oral solution 650 mg  Every 4 Hours PRN        Placed in \"Or\" Linked Group    03/29/24 0519    03/29/24 0519  acetaminophen (TYLENOL) suppository 650 mg  Every 4 Hours PRN        Placed in \"Or\" Linked Group    03/29/24 0519    03/29/24 0519  Potassium Replacement - Follow Nurse / BPA Driven Protocol  As Needed         03/29/24 0519    03/29/24 0519  Magnesium Standard Dose Replacement - Follow Nurse / BPA Driven Protocol  As Needed         03/29/24 0519    03/29/24 0519  melatonin tablet 5 mg  Nightly PRN         03/29/24 0519    03/29/24 0519  sodium chloride 0.9 % flush 10 mL  As Needed         03/29/24 0519    03/29/24 0519  sodium chloride 0.9 % infusion 40 mL  As Needed         03/29/24 0519    03/29/24 0519  ondansetron (ZOFRAN) injection 4 mg  Every 6 Hours PRN         03/29/24 0519    03/29/24 0519  Pharmacy to Dose enoxaparin (LOVENOX)  " "Continuous PRN         03/29/24 0519    03/29/24 0519  nitroglycerin (NITROSTAT) SL tablet 0.4 mg  Every 5 Minutes PRN         03/29/24 0519    03/29/24 0518  sennosides-docusate (PERICOLACE) 8.6-50 MG per tablet 2 tablet  2 Times Daily PRN        Placed in \"And\" Linked Group    03/29/24 0519    03/29/24 0518  polyethylene glycol (MIRALAX) packet 17 g  Daily PRN        Placed in \"And\" Linked Group    03/29/24 0519    03/29/24 0518  bisacodyl (DULCOLAX) EC tablet 5 mg  Daily PRN        Placed in \"And\" Linked Group    03/29/24 0519 03/29/24 0518  bisacodyl (DULCOLAX) suppository 10 mg  Daily PRN        Placed in \"And\" Linked Group    03/29/24 0519    03/29/24 0518  Code Status and Medical Interventions:  Continuous         03/29/24 0519 03/28/24 2233  Initiate Observation Status  Once         03/28/24 2233 03/28/24 2122  High Sensitivity Troponin T 2Hr  PROCEDURE ONCE         03/28/24 1909    03/28/24 1928  sodium chloride 0.9 % bolus 1,000 mL  Once         03/28/24 1912 03/28/24 1918  nitroglycerin (NITROSTAT) SL tablet 0.4 mg  Once         03/28/24 1902    03/28/24 1917  aspirin tablet 325 mg  Once         03/28/24 1901    03/28/24 1914  Magnesium  STAT         03/28/24 1913    03/28/24 1913  CK Total & CKMB  Once         03/28/24 1912    03/28/24 1902  CT Head Without Contrast  1 Time Imaging         03/28/24 1901    03/28/24 1855  BNP  STAT         03/28/24 1854    03/28/24 1842  NPO Diet NPO Type: Strict NPO  Diet Effective Now,   Status:  Canceled         03/28/24 1841 03/28/24 1842  Undress & Gown  Once         03/28/24 1841 03/28/24 1842  Cardiac Monitoring  Continuous,   Status:  Canceled        Comments: Follow Standing Orders As Outlined in Process Instructions (Open Order Report to View Full Instructions)    03/28/24 1841    03/28/24 1842  Continuous Pulse Oximetry  Continuous         03/28/24 1841    03/28/24 1842  ECG 12 Lead ED Triage Standing Order; Chest Pain  Once         03/28/24 " 1841    03/28/24 1842  XR Chest 1 View  1 Time Imaging         03/28/24 1841    03/28/24 1842  Insert Peripheral IV  Once         03/28/24 1841    03/28/24 1842  [place] Draw  Once         03/28/24 1841    03/28/24 1842  CBC & Differential  Once         03/28/24 1841    03/28/24 1842  Comprehensive Metabolic Panel  Once         03/28/24 1841    03/28/24 1842  High Sensitivity Troponin T  Once         03/28/24 1841    03/28/24 1842  Green Top (Gel)  PROCEDURE ONCE         03/28/24 1841    03/28/24 1842  Lavender Top  PROCEDURE ONCE         03/28/24 1841    03/28/24 1842  Gold Top - SST  PROCEDURE ONCE         03/28/24 1841 03/28/24 1842  Light Blue Top  PROCEDURE ONCE         03/28/24 1841    03/28/24 1842  CBC Auto Differential  PROCEDURE ONCE         03/28/24 1841 03/28/24 1841  sodium chloride 0.9 % flush 10 mL  As Needed         03/28/24 1841    Unscheduled  Oxygen Therapy- Nasal Cannula; Titrate 1-6 LPM Per SpO2; 90 - 95%  Continuous PRN       03/28/24 1841    Unscheduled  ECG 12 Lead ED Triage Standing Order; Chest Pain  As Needed      Comments: Persistent, Unrelieved or Recurrent Chest Pain    03/28/24 1841    Unscheduled  Up With Assistance  As Needed       03/29/24 0519    --  lisinopril (PRINIVIL,ZESTRIL) 10 MG tablet  Nightly         03/29/24 0040    --  amLODIPine (NORVASC) 5 MG tablet  Nightly         03/29/24 0040    --  Ergocalciferol (VITAMIN D2 PO)  Weekly         03/29/24 0040    --  simvastatin (ZOCOR) 20 MG tablet  Nightly         03/29/24 0059                  Physician Progress Notes (most recent note)    No notes of this type exist for this encounter.       Consult Notes (most recent note)    No notes of this type exist for this encounter.

## 2024-03-29 NOTE — NURSING NOTE
Patient discharged from ED room 16. IV removed. Tele removed. Discharge instructions reviewed with patient and wife including heart healthy diet and sodium restriction.

## 2024-03-29 NOTE — CASE MANAGEMENT/SOCIAL WORK
Discharge Planning Assessment  [facility]     Patient Name: Justin Llanos  MRN: [id number]  Today's Date: 3/29/2024    Admit Date: 3/28/2024    Plan: Patient is awake and able to answer questions.  He is followed by his PCP at [facility].  He also gets his medications there.  He elects to enroll in MEds to BEd.  He does not use DME and denies the need for any at DC.  At the time of DC he plans to return to the home he shares with his wife.  Questions and concerns were addressed at the time of this conversation.  Will provide additional resources and information upon patient request.   Discharge Needs Assessment       Row Name 03/29/24 1152       Living Environment    People in Home spouse    Name(s) of People in Home Mely Llanos, Wife    Current Living Arrangements home    Duration at Residence 10 years    Potentially Unsafe Housing Conditions none    In the past 12 months has the electric, gas, oil, or water company threatened to shut off services in your home? No    Primary Care Provided by self    Provides Primary Care For no one    Family Caregiver if Needed none    Quality of Family Relationships involved;helpful;supportive    Able to Return to Prior Arrangements yes       Resource/Environmental Concerns    Resource/Environmental Concerns none    Transportation Concerns none       Transportation Needs    In the past 12 months, has lack of transportation kept you from medical appointments or from getting medications? no    In the past 12 months, has lack of transportation kept you from meetings, work, or from getting things needed for daily living? No       Food Insecurity    Within the past 12 months, you worried that your food would run out before you got the money to buy more. Never true    Within the past 12 months, the food you bought just didn't last and you didn't have money to get more. Never true       Transition Planning    Patient/Family Anticipates Transition to home with family     Patient/Family Anticipated Services at Transition none    Transportation Anticipated family or friend will provide       Discharge Needs Assessment    Readmission Within the Last 30 Days no previous admission in last 30 days    Equipment Currently Used at Home none    Concerns to be Addressed denies needs/concerns at this time    Anticipated Changes Related to Illness none    Equipment Needed After Discharge none    Provided Post Acute Provider List? N/A    N/A Provider List Comment Patient plans to DC home; no DME needs    Provided Post Acute Provider Quality & Resource List? N/A    N/A Quality & Resource List Comment Patient plans to DC home; no DME needs                   Discharge Plan       Row Name 03/29/24 1153       Plan    Plan Patient is awake and able to answer questions.  He is followed by his PCP at [facility].  He also gets his medications there.  He elects to enroll in MEds to BEd.  He does not use DME and denies the need for any at DC.  At the time of DC he plans to return to the home he shares with his wife.  Questions and concerns were addressed at the time of this conversation.  Will provide additional resources and information upon patient request.    Patient/Family in Agreement with Plan unable to assess    Provided Post Acute Provider List? N/A    N/A Provider List Comment Patient plans to DC home; no DME needs    Provided Post Acute Provider Quality & Resource List? N/A    N/A Quality & Resource List Comment Patient plans to DC home; no DME needs    Plan Comments Denies needs at this time    Final Discharge Disposition Code 01 - home or self-care    Final Note Plans to DC home with wife                  Continued Care and Services - Admitted Since 3/28/2024    No active coordination exists for this encounter.          Demographic Summary       Row Name 03/29/24 1150       General Information    Admission Type observation    Arrived From emergency department    Referral Source admission  list    Reason for Consult discharge planning    Preferred Language English       Contact Information    Permission Granted to Share Info With                    Functional Status       Row Name 03/29/24 1152       Functional Status    Usual Activity Tolerance excellent    Current Activity Tolerance good       Physical Activity    On average, how many days per week do you engage in moderate to strenuous exercise (like a brisk walk)? 0 days    On average, how many minutes do you engage in exercise at this level? 0 min    Number of minutes of exercise per week 0       Assessment of Health Literacy    How often do you have someone help you read hospital materials? Never    How often do you have problems learning about your medical condition because of difficulty understanding written information? Never    How often do you have a problem understanding what is told to you about your medical condition? Never    How confident are you filling out medical forms by yourself? Extremely    Health Literacy Excellent       Functional Status, IADL    Medications independent    Meal Preparation independent    Housekeeping independent    Laundry independent    Shopping independent       Mental Status    General Appearance WDL WDL       Mental Status Summary    Recent Changes in Mental Status/Cognitive Functioning no changes       Employment/    Employment Status employed full-time                   Psychosocial       Row Name 03/29/24 1152       Values/Beliefs    Spiritual, Cultural Beliefs, Taoist Practices, Values that Affect Care no       Behavior WDL    Behavior WDL WDL       Emotion Mood WDL    Emotion/Mood/Affect WDL WDL       Speech WDL    Speech WDL WDL       Perceptual State WDL    Perceptual State WDL WDL       Thought Process WDL    Thought Process WDL WDL       Intellectual Performance WDL    Intellectual Performance WDL WDL                   Abuse/Neglect       Row Name 03/29/24 6538        Personal Safety    Feels Unsafe at Home or Work/School no    Feels Threatened by Someone no    Does Anyone Try to Keep You From Having Contact with Others or Doing Things Outside Your Home? no    Physical Signs of Abuse Present no                   Legal       Row Name 03/29/24 1152       Financial Resource Strain    How hard is it for you to pay for the very basics like food, housing, medical care, and heating? Not hard                   Substance Abuse       Row Name 03/29/24 1152       Substance Use    Substance Use Status never used                   Patient Forms    No documentation.                     Sena Corona RN     good, to achieve stated therapy goals

## 2024-03-29 NOTE — H&P
"  [facility]      Name:  Justin Llanos   Age:  48 y.o.  Sex:  male  :  1975  MRN:  [id number]   Visit Number:  [id number]  Admission Date:  3/28/2024  Date Of Service:  24  Primary Care Physician:  Provider, No Known    Chief Complaint:     Chest pain    History Of Presenting Illness:      Patient is 48 years old male with a past medical history of COPD and GERD who presented to the ER with a chief complaint of chest pain.  Patient reports that he took his mother to the doctor on the day of admission, they checked his blood pressure and was elevated at 170s systolic.  On the way going home he started having chest pain described as heaviness substernally with no radiation or shortness of breath.  His symptoms started around 45 minutes prior to arrival to the ER on the day of admission.  Patient reported associated headaches which she usually feels when his blood pressure gets elevated and out-of-control. he denies any nausea or vomiting.  No recent cough or fevers.  Patient is a former smoker but has quit smoking since .  He has not seen a cardiologist recently but was told that he had \" electrical Heart issue\" in the past.     On ER evaluation, Patient was hypertensive on arrival with a blood pressure of 154/108, afebrile on room air.  His workup with HS Troponin of 8-7 with delta change of -1, creatinine 1.3 otherwise within acceptable range on his CBC and CMP.  Chest x-ray with no acute process per my read.  CT head without contrast with no acute intracranial findings.  Patient received aspirin 325 mg, nitroglycerin 0.5 sublingual and 1 L bolus of normal saline.  ER consulted hospitalist for admission given his high risk and presentation of chest pain without recent workup.    Review Of Systems:    All systems were reviewed and negative except as mentioned in history of presenting illness, assessment and plan.    Past Medical History: Patient  has " a past medical history of COPD (chronic obstructive pulmonary disease), GERD (gastroesophageal reflux disease), and Heart abnormality.    Past Surgical History: Patient  has a past surgical history that includes Hernia repair and Cholecystectomy.    Social History: Patient  reports that he has been smoking cigarettes. He has a 36 pack-year smoking history. He has never used smokeless tobacco. He reports that he does not drink alcohol and does not use drugs.    Family History:  Patient's family history has been reviewed and found to be noncontributory.     Allergies:      Sulfa antibiotics    Home Medications:    Prior to Admission Medications       Prescriptions Last Dose Informant Patient Reported? Taking?    albuterol (VENTOLIN HFA) 108 (90 Base) MCG/ACT inhaler   No No    Inhale 2 puffs Every 4 (Four) Hours As Needed for Wheezing or Shortness of Air.    aspirin 81 MG EC tablet   Yes No    Take 81 mg by mouth Daily.    BREO ELLIPTA 100-25 MCG/INH aerosol powder    Yes No    take 1 inhalation by mouth once daily    buPROPion SR (WELLBUTRIN SR) 150 MG 12 hr tablet   No No    Take 1 tablet by mouth Every 12 (Twelve) Hours.    cyclobenzaprine (FLEXERIL) 10 MG tablet   No No    Take 1 tablet by mouth 3 (Three) Times a Day As Needed for Muscle Spasms.    fluticasone (FLONASE) 50 MCG/ACT nasal spray   Yes No    2 sprays into each nostril Daily.    ibuprofen (ADVIL,MOTRIN) 800 MG tablet   No No    Take 1 tablet by mouth Every 8 (Eight) Hours As Needed for Mild Pain .    lisinopril (PRINIVIL,ZESTRIL) 2.5 MG tablet   Yes No    take 1 tablet by mouth once daily    metoprolol succinate XL (TOPROL-XL) 25 MG 24 hr tablet   Yes No    TAKE 1/2 TABLET BY MOUTH TWICE DAILY    pantoprazole (PROTONIX) 40 MG EC tablet   Yes No    Tiotropium Bromide Monohydrate (SPIRIVA RESPIMAT) 2.5 MCG/ACT aerosol solution   No No    Inhale 2 puffs Daily.          ED Medications:    Medications   sodium chloride 0.9 % flush 10 mL (has no  "administration in time range)   aspirin tablet 325 mg (325 mg Oral Given 3/28/24 1909)   nitroglycerin (NITROSTAT) SL tablet 0.4 mg (0.4 mg Sublingual Given 3/28/24 1909)   sodium chloride 0.9 % bolus 1,000 mL (1,000 mL Intravenous New Bag 3/28/24 1940)     Vital Signs:  Temp:  [97.2 °F (36.2 °C)] 97.2 °F (36.2 °C)  Heart Rate:  [] 74  Resp:  [20] 20  BP: (126-154)/() 137/80        03/28/24  1836   Weight: 117 kg (259 lb)     Body mass index is 29.93 kg/m².    Physical Exam:     Most recent vital Signs: /80   Pulse 74   Temp 97.2 °F (36.2 °C) (Oral)   Resp 20   Ht 198.1 cm (78\")   Wt 117 kg (259 lb)   SpO2 98%   BMI 29.93 kg/m²     Physical Exam  Vitals and nursing note reviewed.   Constitutional:       General: He is not in acute distress.     Appearance: Normal appearance.   HENT:      Head: Normocephalic and atraumatic.      Right Ear: External ear normal.      Left Ear: External ear normal.      Nose: Nose normal.      Mouth/Throat:      Mouth: Mucous membranes are moist.   Eyes:      Extraocular Movements: Extraocular movements intact.      Conjunctiva/sclera: Conjunctivae normal.      Pupils: Pupils are equal, round, and reactive to light.   Cardiovascular:      Rate and Rhythm: Normal rate and regular rhythm.      Pulses: Normal pulses.      Heart sounds: Normal heart sounds.   Pulmonary:      Effort: Pulmonary effort is normal.      Breath sounds: Normal breath sounds. No wheezing or rhonchi.   Abdominal:      General: Bowel sounds are normal. There is no distension.      Palpations: Abdomen is soft.      Tenderness: There is no abdominal tenderness.   Musculoskeletal:         General: Normal range of motion.      Cervical back: Normal range of motion and neck supple.      Right lower leg: No edema.      Left lower leg: No edema.   Skin:     General: Skin is warm and dry.      Findings: No rash.   Neurological:      General: No focal deficit present.      Mental Status: He is alert " "and oriented to person, place, and time. Mental status is at baseline.      Cranial Nerves: No cranial nerve deficit.      Sensory: No sensory deficit.      Motor: No weakness.   Psychiatric:         Mood and Affect: Mood normal.         Behavior: Behavior normal.         Thought Content: Thought content normal.         Laboratory data:    I have reviewed the labs done in the emergency room.    Results from last 7 days   Lab Units 03/28/24  1844   SODIUM mmol/L 140   POTASSIUM mmol/L 3.8   CHLORIDE mmol/L 104   CO2 mmol/L 25.7   BUN mg/dL 17   CREATININE mg/dL 1.30*   CALCIUM mg/dL 9.0   BILIRUBIN mg/dL 0.3   ALK PHOS U/L 97   ALT (SGPT) U/L 30   AST (SGOT) U/L 19   GLUCOSE mg/dL 118*     Results from last 7 days   Lab Units 03/28/24  1844   WBC 10*3/mm3 8.39   HEMOGLOBIN g/dL 14.8   HEMATOCRIT % 43.0   PLATELETS 10*3/mm3 300         Results from last 7 days   Lab Units 03/28/24 2127 03/28/24  1844   CK TOTAL U/L  --  127   HSTROP T ng/L 7 8     Results from last 7 days   Lab Units 03/28/24  1844   PROBNP pg/mL 65.7                       Invalid input(s): \"USDES\", \"NITRITITE\", \"BACT\", \"EP\"    Pain Management Panel           No data to display                EKG:      Sinus rhythm, heart rate 90, left bundle branch block, nonspecific ST/T wave changes.    Radiology:    CT Head Without Contrast    Result Date: 3/28/2024  FINAL REPORT TECHNIQUE: null CLINICAL HISTORY: headache hTN COMPARISON: null FINDINGS: CT head without contrast Comparison: None Findings: No intra-axial mass, midline shift, hydrocephalus, or acute hemorrhage. No significant atrophy-like change or white matter disease. There is no sinus or mastoid fluid. The orbits are within normal limits. No skull fracture.     IMPRESSION: No acute intracranial findings. Authenticated and Electronically Signed by Polo Adamson MD on 03/28/2024 09:34:02 PM     Assessment:    Chest pain, POA  Hypertension, uncontrolled  History of COPD without " exacerbation  GERD    Plan:    Patient is admitted for further management and treatment.    Chest pain  -Continue aspirin and statin  -Check lipid panel and A1c with a.m. labs  -Nitro as needed  -Cardiology consulted, appreciate recommendations    Hypertension  -Uncontrolled  -Patient did not take his blood pressure medicine yesterday.    -He reports that he was of his medicines last week but picked up his medicine 3 days ago.  -Restart home meds  -Hydralazine as needed    -Continue home meds as warranted.  -Further orders as indicated per clinical course.      Risk Assessment: Moderate  DVT Prophylaxis: Lovenox  Code Status: Full  Diet: N.p.o.    Advance Care Planning   ACP discussion was held with the patient during this visit. Patient does not have an advance directive, information provided.       Cora Noguera MD  03/28/24  22:25 EDT    Dictated utilizing Dragon dictation.

## 2024-04-01 NOTE — DISCHARGE SUMMARY
"    [facility]      Name:  Justin Llanos   Age:  48 y.o.  Sex:  male  :  1975  MRN:  [id number]   Visit Number:  [id number]    Admission Date:  3/28/2024  Date of Discharge:  2024  Primary Care Physician:  Provider, No Known    Important issues to note:    Start: Aspirin, Coreg  Stop: Nothing  Follow up: PCP and cardiology  Brief Summary: Presented with chest pain due to undetermined etiology.  Was assessed by cardiology recommended outpatient follow-up for stress test. Was stable for discharge.    Discharge Diagnoses:       Chest pain, unspecified        Problem List:     Active Hospital Problems    Diagnosis  POA    **Chest pain, unspecified [R07.9]  Yes      Resolved Hospital Problems   No resolved problems to display.     Presenting Problem:    Chief Complaint   Patient presents with    Chest Pain    Hypertension     Pt states he had high blood pressure 175/110 at home and \"felt funny\" Pt states his face and head felt weird. Now chest tightness.       Consults:     Consulting Physician(s)         Provider   Role Sheldon Park MD      Consulting Physician Cardiology     Cora Noguera MD      Consulting Physician Hospitalist          History of presenting illness:     Patient is 48 years old male with a past medical history of COPD and GERD who presented to the ER with a chief complaint of chest pain.  Patient reports that he took his mother to the doctor on the day of admission, they checked his blood pressure and was elevated at 170s systolic.  On the way going home he started having chest pain described as heaviness substernally with no radiation or shortness of breath.  His symptoms started around 45 minutes prior to arrival to the ER on the day of admission.  Patient reported associated headaches which she usually feels when his blood pressure gets elevated and out-of-control. he denies any nausea or vomiting.  No recent cough or " "fevers.  Patient is a former smoker but has quit smoking since June.  He has not seen a cardiologist recently but was told that he had \" electrical Heart issue\" in the past.      On ER evaluation, Patient was hypertensive on arrival with a blood pressure of 154/108, afebrile on room air.  His workup with HS Troponin of 8-7 with delta change of -1, creatinine 1.3 otherwise within acceptable range on his CBC and CMP.  Chest x-ray with no acute process per my read.  CT head without contrast with no acute intracranial findings.  Patient received aspirin 325 mg, nitroglycerin 0.5 sublingual and 1 L bolus of normal saline.  ER consulted hospitalist for admission given his high risk and presentation of chest pain without recent workup.       Hospital course:       Chest pain    -Patient was seen by cardiology recommended increasing doses of antihypertensives and stress test outpatient.  Patient was stable for discharge was not having chest pain at the time of discharge.      Vital Signs:  Reviewed    Physical Exam:     Constitutional: No acute distress, awake, alert  HENT: NCAT, mucous membranes moist  Respiratory: Clear to auscultation bilaterally, respiratory effort normal   Cardiovascular: RRR, no murmurs, rubs, or gallops  Gastrointestinal: Positive bowel sounds, soft, nontender, nondistended  Musculoskeletal: No bilateral ankle edema  Psychiatric: Appropriate affect, cooperative  Neurologic: Oriented x 3, speech clear  Skin: No rashes    Pertinent Lab Results:     Results from last 7 days   Lab Units 03/29/24  0542 03/28/24  1844   SODIUM mmol/L 139 140   POTASSIUM mmol/L 4.4 3.8   CHLORIDE mmol/L 105 104   CO2 mmol/L 24.5 25.7   BUN mg/dL 17 17   CREATININE mg/dL 1.04 1.30*   CALCIUM mg/dL 8.4* 9.0   BILIRUBIN mg/dL 0.4 0.3   ALK PHOS U/L 78 97   ALT (SGPT) U/L 26 30   AST (SGOT) U/L 19 19   GLUCOSE mg/dL 93 118*     Results from last 7 days   Lab Units 03/29/24  0542 03/28/24  1844   WBC 10*3/mm3 6.00 8.39 "   HEMOGLOBIN g/dL 13.8 14.8   HEMATOCRIT % 40.8 43.0   PLATELETS 10*3/mm3 240 300         Results from last 7 days   Lab Units 03/28/24 2127 03/28/24  1844   CK TOTAL U/L  --  127   HSTROP T ng/L 7 8     Results from last 7 days   Lab Units 03/28/24  1844   PROBNP pg/mL 65.7                       Pertinent Radiology Results:    Imaging Results (All)       Procedure Component Value Units Date/Time    XR Chest 1 View [[id number]] Collected: 03/29/24 0955     Updated: 03/29/24 1002    Narrative:      PROCEDURE: XR CHEST 1 VW-        HISTORY: Chest Pain Triage Protocol, hypertension     COMPARISON: None.     FINDINGS: Apical lordotic view. The heart is normal in size. The  mediastinum is unremarkable. The lungs are clear. There is no  pneumothorax. There are no acute osseous abnormalities.       Impression:      No acute cardiopulmonary process.        Images were reviewed, interpreted, and dictated by Dr. Kaykay Acharya MD  Transcribed by Sherri Johnson PA-C.     This report was signed and finalized on 3/29/2024 10:00 AM by Kaykay Acharya MD.       CT Head Without Contrast [[id number]] Collected: 03/28/24 2134     Updated: 03/28/24 2136    Narrative:      FINAL REPORT    TECHNIQUE:  null    CLINICAL HISTORY:  headache hTN    COMPARISON:  null    FINDINGS:  CT head without contrast    Comparison: None    Findings:    No intra-axial mass, midline shift, hydrocephalus, or acute hemorrhage.    No significant atrophy-like change or white matter disease.    There is no sinus or mastoid fluid.    The orbits are within normal limits.    No skull fracture.      Impression:      IMPRESSION:    No acute intracranial findings.    Authenticated and Electronically Signed by Polo Adamson MD on  03/28/2024 09:34:02 PM            Echo:      Condition on Discharge:      Stable.    Code status during the hospital stay:    Code Status and Medical Interventions:   Ordered at: 03/29/24 0519     Code Status (Patient has no pulse and is not  breathing):    CPR (Attempt to Resuscitate)     Medical Interventions (Patient has pulse or is breathing):    Full Support     Discharge Disposition:    Home or Self Care    Discharge Medications:       Discharge Medications        New Medications        Instructions Start Date   aspirin 81 MG EC tablet   81 mg, Oral, Daily      carvedilol 12.5 MG tablet  Commonly known as: COREG   12.5 mg, Oral, 2 Times Daily With Meals             Changes to Medications        Instructions Start Date   amLODIPine 10 MG tablet  Commonly known as: NORVASC  What changed:   medication strength  how much to take   10 mg, Oral, Nightly      buPROPion  MG 12 hr tablet  Commonly known as: Wellbutrin SR  What changed:   how much to take  when to take this   150 mg, Oral, Every 12 Hours Scheduled      lisinopril 20 MG tablet  Commonly known as: PRINIVIL,ZESTRIL  What changed:   medication strength  how much to take   20 mg, Oral, Nightly             Continue These Medications        Instructions Start Date   ibuprofen 800 MG tablet  Commonly known as: ADVIL,MOTRIN   800 mg, Oral, Every 8 Hours PRN      simvastatin 20 MG tablet  Commonly known as: ZOCOR   20 mg, Oral, Nightly      VITAMIN D2 PO   1 tablet, Oral, Weekly             Discharge Diet:     Diet Instructions       Advance Diet As Tolerated -Target Diet: 2 liter fluid restriction daily 1500 mg sodium restriction daily      Target Diet: 2 liter fluid restriction daily 1500 mg sodium restriction daily          Activity at Discharge:       Follow-up Appointments:    Additional Instructions for the Follow-ups that You Need to Schedule       Discharge Follow-up with PCP   As directed       Currently Documented PCP:    Christine Madrigal Known    PCP Phone Number:    [phone]     Follow Up Details: 1 week        Discharge Follow-up with Specified Provider: Cardiology; 1 Week   As directed      To: Cardiology   Follow Up: 1 Week               Follow-up Information       Christine Madrigal  Known .    Why: 1 week  Contact information:  [facility] [address]  [phone]               [facility] Follow up on 4/5/2024.    Contact information:  [address] [place], [place] [address] [phone]                         No future appointments.  Test Results Pending at Discharge:           Kristian Toscano DO  04/01/24  14:55 EDT    Time: I spent 45 minutes on this discharge activity which included: face-to-face encounter with the patient, reviewing the data in the system, coordination of the care with the nursing staff as well as consultants, documentation, and entering orders.     Dictated utilizing Dragon dictation.

## 2024-10-22 ENCOUNTER — APPOINTMENT (OUTPATIENT)
Dept: GENERAL RADIOLOGY | Facility: HOSPITAL | Age: 49
End: 2024-10-22
Payer: COMMERCIAL

## 2024-10-22 ENCOUNTER — APPOINTMENT (OUTPATIENT)
Dept: CT IMAGING | Facility: HOSPITAL | Age: 49
End: 2024-10-22
Payer: COMMERCIAL

## 2024-10-22 ENCOUNTER — HOSPITAL ENCOUNTER (EMERGENCY)
Facility: HOSPITAL | Age: 49
Discharge: HOME | End: 2024-10-22
Attending: STUDENT IN AN ORGANIZED HEALTH CARE EDUCATION/TRAINING PROGRAM | Admitting: STUDENT IN AN ORGANIZED HEALTH CARE EDUCATION/TRAINING PROGRAM
Payer: COMMERCIAL

## 2024-10-22 VITALS
TEMPERATURE: 98.1 F | HEART RATE: 75 BPM | BODY MASS INDEX: 30.08 KG/M2 | DIASTOLIC BLOOD PRESSURE: 98 MMHG | WEIGHT: 260 LBS | SYSTOLIC BLOOD PRESSURE: 154 MMHG | HEIGHT: 78 IN | RESPIRATION RATE: 18 BRPM | OXYGEN SATURATION: 100 %
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
## 2024-10-22 DIAGNOSIS — S30.0XXA CONTUSION OF PELVIS, INITIAL ENCOUNTER: ICD-10-CM

## 2024-10-22 LAB
HOLD SPECIMEN: NORMAL
HOLD SPECIMEN: NORMAL
WHOLE BLOOD HOLD COAG: NORMAL
WHOLE BLOOD HOLD SPECIMEN: NORMAL

## 2024-10-22 PROCEDURE — 70450 CT HEAD/BRAIN W/O DYE: CPT

## 2024-10-22 PROCEDURE — 25010000002 DIPHENHYDRAMINE PER 50 MG

## 2024-10-22 PROCEDURE — 96375 TX/PRO/DX INJ NEW DRUG ADDON: CPT

## 2024-10-22 PROCEDURE — 74177 CT ABD & PELVIS W/CONTRAST: CPT

## 2024-10-22 PROCEDURE — 99285 EMERGENCY DEPT VISIT HI MDM: CPT

## 2024-10-22 PROCEDURE — 73130 X-RAY EXAM OF HAND: CPT

## 2024-10-22 PROCEDURE — 73030 X-RAY EXAM OF SHOULDER: CPT

## 2024-10-22 PROCEDURE — 25510000001 IOPAMIDOL 61 % SOLUTION: Performed by: STUDENT IN AN ORGANIZED HEALTH CARE EDUCATION/TRAINING PROGRAM

## 2024-10-22 PROCEDURE — 96374 THER/PROPH/DIAG INJ IV PUSH: CPT

## 2024-10-22 PROCEDURE — 25010000002 METHYLPREDNISOLONE PER 125 MG

## 2024-10-22 PROCEDURE — 71260 CT THORAX DX C+: CPT

## 2024-10-22 PROCEDURE — 25010000002 ONDANSETRON PER 1 MG: Performed by: STUDENT IN AN ORGANIZED HEALTH CARE EDUCATION/TRAINING PROGRAM

## 2024-10-22 PROCEDURE — 73060 X-RAY EXAM OF HUMERUS: CPT

## 2024-10-22 PROCEDURE — 25010000002 MORPHINE PER 10 MG: Performed by: STUDENT IN AN ORGANIZED HEALTH CARE EDUCATION/TRAINING PROGRAM

## 2024-10-22 PROCEDURE — 72125 CT NECK SPINE W/O DYE: CPT

## 2024-10-22 RX ORDER — CYCLOBENZAPRINE HCL 10 MG
10 TABLET ORAL 3 TIMES DAILY PRN
Qty: 12 TABLET | Refills: 0 | Status: SHIPPED | OUTPATIENT
Start: 2024-10-22

## 2024-10-22 RX ORDER — MELOXICAM 7.5 MG/1
7.5 TABLET ORAL DAILY
Qty: 7 TABLET | Refills: 0 | Status: SHIPPED | OUTPATIENT
Start: 2024-10-22 | End: 2024-10-29

## 2024-10-22 RX ORDER — DIPHENHYDRAMINE HYDROCHLORIDE 50 MG/ML
25 INJECTION INTRAMUSCULAR; INTRAVENOUS ONCE
Status: COMPLETED | OUTPATIENT
Start: 2024-10-22 | End: 2024-10-22

## 2024-10-22 RX ORDER — METHYLPREDNISOLONE SODIUM SUCCINATE 125 MG/2ML
125 INJECTION, POWDER, LYOPHILIZED, FOR SOLUTION INTRAMUSCULAR; INTRAVENOUS ONCE
Status: COMPLETED | OUTPATIENT
Start: 2024-10-22 | End: 2024-10-22

## 2024-10-22 RX ORDER — FAMOTIDINE 10 MG/ML
20 INJECTION, SOLUTION INTRAVENOUS ONCE
Status: COMPLETED | OUTPATIENT
Start: 2024-10-22 | End: 2024-10-22

## 2024-10-22 RX ORDER — ONDANSETRON 2 MG/ML
4 INJECTION INTRAMUSCULAR; INTRAVENOUS ONCE
Status: COMPLETED | OUTPATIENT
Start: 2024-10-22 | End: 2024-10-22

## 2024-10-22 RX ORDER — IOPAMIDOL 612 MG/ML
100 INJECTION, SOLUTION INTRAVASCULAR
Status: COMPLETED | OUTPATIENT
Start: 2024-10-22 | End: 2024-10-22

## 2024-10-22 RX ADMIN — IOPAMIDOL 100 ML: 612 INJECTION, SOLUTION INTRAVENOUS at 18:13

## 2024-10-22 RX ADMIN — ONDANSETRON 4 MG: 2 INJECTION INTRAMUSCULAR; INTRAVENOUS at 16:36

## 2024-10-22 RX ADMIN — FAMOTIDINE 20 MG: 10 INJECTION INTRAVENOUS at 16:56

## 2024-10-22 RX ADMIN — METHYLPREDNISOLONE SODIUM SUCCINATE 125 MG: 125 INJECTION, POWDER, FOR SOLUTION INTRAMUSCULAR; INTRAVENOUS at 16:56

## 2024-10-22 RX ADMIN — DIPHENHYDRAMINE HYDROCHLORIDE 25 MG: 50 INJECTION, SOLUTION INTRAMUSCULAR; INTRAVENOUS at 16:56

## 2024-10-22 RX ADMIN — MORPHINE SULFATE 4 MG: 4 INJECTION, SOLUTION INTRAMUSCULAR; INTRAVENOUS at 17:17
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
## 2024-10-22 NOTE — Clinical Note
[facility] EMERGENCY DEPARTMENT  [address] [place] [address]  Phone: [phone]    Justin Llanos was seen and treated in our emergency department on 10/22/2024.  He may return to work on 10/24/2024.         Thank you for choosing [facility].    Torres Beck RN